# Patient Record
Sex: FEMALE | Race: WHITE | NOT HISPANIC OR LATINO | Employment: FULL TIME | URBAN - METROPOLITAN AREA
[De-identification: names, ages, dates, MRNs, and addresses within clinical notes are randomized per-mention and may not be internally consistent; named-entity substitution may affect disease eponyms.]

---

## 2021-07-16 ENCOUNTER — RECORDS - HEALTHEAST (OUTPATIENT)
Dept: ADMINISTRATIVE | Facility: CLINIC | Age: 26
End: 2021-07-16

## 2021-10-11 ENCOUNTER — APPOINTMENT (OUTPATIENT)
Dept: ULTRASOUND IMAGING | Facility: CLINIC | Age: 26
End: 2021-10-11
Attending: EMERGENCY MEDICINE
Payer: COMMERCIAL

## 2021-10-11 ENCOUNTER — HOSPITAL ENCOUNTER (EMERGENCY)
Facility: CLINIC | Age: 26
Discharge: HOME | End: 2021-10-11
Attending: EMERGENCY MEDICINE | Admitting: EMERGENCY MEDICINE
Payer: COMMERCIAL

## 2021-10-11 VITALS
WEIGHT: 290 LBS | HEIGHT: 67 IN | OXYGEN SATURATION: 99 % | HEART RATE: 72 BPM | RESPIRATION RATE: 23 BRPM | DIASTOLIC BLOOD PRESSURE: 67 MMHG | SYSTOLIC BLOOD PRESSURE: 126 MMHG | BODY MASS INDEX: 45.52 KG/M2 | TEMPERATURE: 97.6 F

## 2021-10-11 DIAGNOSIS — K80.50 BILIARY COLIC: ICD-10-CM

## 2021-10-11 LAB
ALBUMIN SERPL-MCNC: 3.9 G/DL (ref 3.5–5)
ALP SERPL-CCNC: 46 U/L (ref 45–120)
ALT SERPL W P-5'-P-CCNC: 24 U/L (ref 0–45)
ANION GAP SERPL CALCULATED.3IONS-SCNC: 14 MMOL/L (ref 5–18)
AST SERPL W P-5'-P-CCNC: 26 U/L (ref 0–40)
ATRIAL RATE - MUSE: 76 BPM
BASOPHILS # BLD AUTO: 0.1 10E3/UL (ref 0–0.2)
BASOPHILS NFR BLD AUTO: 1 %
BILIRUB SERPL-MCNC: 0.7 MG/DL (ref 0–1)
BUN SERPL-MCNC: 10 MG/DL (ref 8–22)
CALCIUM SERPL-MCNC: 9.4 MG/DL (ref 8.5–10.5)
CHLORIDE BLD-SCNC: 107 MMOL/L (ref 98–107)
CO2 SERPL-SCNC: 21 MMOL/L (ref 22–31)
CREAT SERPL-MCNC: 0.79 MG/DL (ref 0.6–1.1)
DIASTOLIC BLOOD PRESSURE - MUSE: 85 MMHG
EOSINOPHIL # BLD AUTO: 0.2 10E3/UL (ref 0–0.7)
EOSINOPHIL NFR BLD AUTO: 2 %
ERYTHROCYTE [DISTWIDTH] IN BLOOD BY AUTOMATED COUNT: 12.7 % (ref 10–15)
GFR SERPL CREATININE-BSD FRML MDRD: >90 ML/MIN/1.73M2
GLUCOSE BLD-MCNC: 106 MG/DL (ref 70–125)
HCG SERPL QL: NEGATIVE
HCT VFR BLD AUTO: 40.1 % (ref 35–47)
HGB BLD-MCNC: 12.6 G/DL (ref 11.7–15.7)
HOLD SPECIMEN: NORMAL
IMM GRANULOCYTES # BLD: 0 10E3/UL
IMM GRANULOCYTES NFR BLD: 0 %
INTERPRETATION ECG - MUSE: NORMAL
LIPASE SERPL-CCNC: 16 U/L (ref 0–52)
LYMPHOCYTES # BLD AUTO: 2.1 10E3/UL (ref 0.8–5.3)
LYMPHOCYTES NFR BLD AUTO: 31 %
MCH RBC QN AUTO: 26.8 PG (ref 26.5–33)
MCHC RBC AUTO-ENTMCNC: 31.4 G/DL (ref 31.5–36.5)
MCV RBC AUTO: 85 FL (ref 78–100)
MONOCYTES # BLD AUTO: 0.6 10E3/UL (ref 0–1.3)
MONOCYTES NFR BLD AUTO: 8 %
NEUTROPHILS # BLD AUTO: 3.8 10E3/UL (ref 1.6–8.3)
NEUTROPHILS NFR BLD AUTO: 58 %
NRBC # BLD AUTO: 0 10E3/UL
NRBC BLD AUTO-RTO: 0 /100
P AXIS - MUSE: 34 DEGREES
PLATELET # BLD AUTO: 271 10E3/UL (ref 150–450)
POTASSIUM BLD-SCNC: 4 MMOL/L (ref 3.5–5)
PR INTERVAL - MUSE: 142 MS
PROT SERPL-MCNC: 7.2 G/DL (ref 6–8)
QRS DURATION - MUSE: 88 MS
QT - MUSE: 382 MS
QTC - MUSE: 429 MS
R AXIS - MUSE: 13 DEGREES
RBC # BLD AUTO: 4.71 10E6/UL (ref 3.8–5.2)
SODIUM SERPL-SCNC: 142 MMOL/L (ref 136–145)
SYSTOLIC BLOOD PRESSURE - MUSE: 146 MMHG
T AXIS - MUSE: 24 DEGREES
VENTRICULAR RATE- MUSE: 76 BPM
WBC # BLD AUTO: 6.6 10E3/UL (ref 4–11)

## 2021-10-11 PROCEDURE — 250N000013 HC RX MED GY IP 250 OP 250 PS 637: Performed by: EMERGENCY MEDICINE

## 2021-10-11 PROCEDURE — 83690 ASSAY OF LIPASE: CPT | Performed by: EMERGENCY MEDICINE

## 2021-10-11 PROCEDURE — C9113 INJ PANTOPRAZOLE SODIUM, VIA: HCPCS | Performed by: EMERGENCY MEDICINE

## 2021-10-11 PROCEDURE — 250N000011 HC RX IP 250 OP 636: Performed by: EMERGENCY MEDICINE

## 2021-10-11 PROCEDURE — 96375 TX/PRO/DX INJ NEW DRUG ADDON: CPT

## 2021-10-11 PROCEDURE — 99285 EMERGENCY DEPT VISIT HI MDM: CPT | Mod: 25

## 2021-10-11 PROCEDURE — 96374 THER/PROPH/DIAG INJ IV PUSH: CPT

## 2021-10-11 PROCEDURE — 93005 ELECTROCARDIOGRAM TRACING: CPT | Performed by: EMERGENCY MEDICINE

## 2021-10-11 PROCEDURE — 85025 COMPLETE CBC W/AUTO DIFF WBC: CPT | Performed by: EMERGENCY MEDICINE

## 2021-10-11 PROCEDURE — 36415 COLL VENOUS BLD VENIPUNCTURE: CPT | Performed by: EMERGENCY MEDICINE

## 2021-10-11 PROCEDURE — 76705 ECHO EXAM OF ABDOMEN: CPT

## 2021-10-11 PROCEDURE — 80053 COMPREHEN METABOLIC PANEL: CPT | Performed by: EMERGENCY MEDICINE

## 2021-10-11 PROCEDURE — 250N000009 HC RX 250: Performed by: EMERGENCY MEDICINE

## 2021-10-11 PROCEDURE — 84703 CHORIONIC GONADOTROPIN ASSAY: CPT | Performed by: EMERGENCY MEDICINE

## 2021-10-11 RX ORDER — FAMOTIDINE 20 MG/1
20 TABLET, FILM COATED ORAL 2 TIMES DAILY
Qty: 30 TABLET | Refills: 0 | Status: SHIPPED | OUTPATIENT
Start: 2021-10-11 | End: 2021-10-11

## 2021-10-11 RX ADMIN — LIDOCAINE HYDROCHLORIDE 30 ML: 20 SOLUTION ORAL; TOPICAL at 04:49

## 2021-10-11 RX ADMIN — FAMOTIDINE 20 MG: 10 INJECTION, SOLUTION INTRAVENOUS at 04:52

## 2021-10-11 RX ADMIN — PANTOPRAZOLE SODIUM 40 MG: 40 INJECTION, POWDER, FOR SOLUTION INTRAVENOUS at 04:50

## 2021-10-11 ASSESSMENT — ENCOUNTER SYMPTOMS
VOMITING: 0
CHEST TIGHTNESS: 1
NAUSEA: 0
DIAPHORESIS: 0
SHORTNESS OF BREATH: 1

## 2021-10-11 ASSESSMENT — MIFFLIN-ST. JEOR: SCORE: 2078.06

## 2021-10-11 NOTE — ED PROVIDER NOTES
NAME: Elizabeth Cortez  AGE: 26 year old female  YOB: 1995  MRN: [id number]  EVALUATION DATE & TIME: 10/11/2021  3:28 AM    PCP: Sherrie Meza    ED PROVIDER: Hank Villavicencio M.D.      Chief Complaint   Patient presents with     Chest Pain     Shortness of Breath     FINAL IMPRESSION:  1. Biliary colic      MEDICAL DECISION MAKIN:08 AM I met with the patient, obtained history, performed an initial exam, and discussed options and plan for diagnostics and treatment here in the ED. PPE: Provider wore gloves and surgical mask.     Patient was clinically assessed and consented to treatment. After assessment, medical decision making and workup were discussed with the patient. The patient was agreeable to plan for testing, workup, and treatment.  Pertinent Labs & Imaging studies reviewed. (See chart for details)         Elizabeth Cortez is a 26 year oldfemale who presents with chest pain shortness of breath.   Differential diagnosis includes but not limited to GERD with esophagitis, esophageal spasm, acute coronary syndrome, pulmonary embolism, biliary colic, cholecystitis, choledocholithiasis.  Patient with sudden onset burning chest pain and spasms along with feeling something caught her breath.  She reports that it comes intermittently and she has had this in the past.  Patient has been seen previously and given GI cocktail for GERD with esophagitis she reports.  She does not appear in acute distress and no leg swelling or tenderness I do not suspect acute coronary syndrome or pulmonary embolism.  Patient is more tender in the epigastrium just under the xiphoid.  More concerning would be esophageal spasm or possibly gallbladder such as biliary colic.  Patient was labs ordered but also given GI cocktail, Pepcid, and Protonix for GERD treatment.  EKG was unremarkable for any acute ischemia and given patient's age I do not feel cardiac etiology is likely.  Pain is also not likely pleuritic  and patient is not short of breath nor tachycardic or hypoxic and unlikely pulmonary embolism.  Labs so far unremarkable with normal CBC, negative lipase and normal metabolic panel.  Patient was also not pregnant and will be sent for ultrasound to evaluate gallbladder.  Patient symptoms better with a GI cocktail, Pepcid and Protonix and likely GERD related however will await ultrasound results.  Ultrasound showing cholelithiasis however no signs of cholecystitis.  Patient's pain completely gone and feeling better and I feel this is likely biliary colic and not GERD with esophagitis.  Patient symptoms would fit with biliary colic as she has the spasms specially after heavy or greasy meals such as pizza she ate last night.  Pain is controlled and labs otherwise were unremarkable patient will be amenable to outpatient elective cholecystectomy.  Given instructions regarding the biliary colic and discussed the ultrasound.  Patient will be recommended for outpatient follow-up and will contact her primary doctor for surgical referral as well I gave her our general surgery clinic follow-up if she wishes to see our general surgery team for elective cholecystectomy.    The importance of close follow up was discussed. We reviewed warning signs and symptoms, and I instructed Ms. Cortez to return to the emergency department immediately if she develops any new or worsening symptoms. I provided additional verbal discharge instructions. Ms. Cortez expressed understanding and agreement with this plan of care, her questions were answered, and she was discharged in stable condition.       0 minutes of critical care time    MEDICATIONS GIVEN IN THE EMERGENCY:  Medications   lidocaine (XYLOCAINE) 2 % 15 mL, alum & mag hydroxide-simethicone (MAALOX) 15 mL GI Cocktail (30 mLs Oral Given 10/11/21 7928)   famotidine (PEPCID) injection 20 mg (20 mg Intravenous Given 10/11/21 0844)   pantoprazole (PROTONIX) IV push injection 40 mg (40 mg  "Intravenous Given 10/11/21 0450)       NEW PRESCRIPTIONS STARTED AT TODAY'S ER VISIT:  There are no discharge medications for this patient.         =================================================================    HPI    Patient information was obtained from: patient    Use of : N/A         Elizabeth Cortez is a 26 year old female with a reported history of GERD, who presents to this ED for evaluation of chest pain and shortness of breath.     Patient reports chest tightness/burning and shortness of breath onset at 0230 this morning, which woke her from sleep. States that it is one of her \"GERD attacks,\" as she has had several bad episodes this year and symptoms are similar. Took 20mg Famotidine and Tums prior to arrival. Notes that she had pizza last night for dinner. Otherwise denies nausea, vomiting, diaphoresis, or any other complaints at this time.      REVIEW OF SYSTEMS   Review of Systems   Constitutional: Negative for diaphoresis.   Respiratory: Positive for chest tightness (and burning) and shortness of breath (secondary to chest burning).    Gastrointestinal: Negative for nausea and vomiting.   All other systems reviewed and are negative.       PAST MEDICAL HISTORY:  History reviewed. No pertinent past medical history.    PAST SURGICAL HISTORY:  History reviewed. No pertinent surgical history.    CURRENT MEDICATIONS:    No current facility-administered medications for this encounter.  No current outpatient medications on file.    ALLERGIES:  Allergies   Allergen Reactions     Vancomycin Anaphylaxis       FAMILY HISTORY:  History reviewed. No pertinent family history.    SOCIAL HISTORY:   Social History     Socioeconomic History     Marital status: Single     Spouse name: Not on file     Number of children: Not on file     Years of education: Not on file     Highest education level: Not on file   Occupational History     Not on file   Tobacco Use     Smoking status: Never Smoker   Substance " "and Sexual Activity     Alcohol use: Not on file     Drug use: Not on file     Sexual activity: Not on file   Other Topics Concern     Not on file   Social History Narrative     Not on file     Social Determinants of Health     Financial Resource Strain:      Difficulty of Paying Living Expenses:    Food Insecurity:      Worried About Running Out of Food in the Last Year:      Ran Out of Food in the Last Year:    Transportation Needs:      Lack of Transportation (Medical):      Lack of Transportation (Non-Medical):    Physical Activity:      Days of Exercise per Week:      Minutes of Exercise per Session:    Stress:      Feeling of Stress :    Social Connections:      Frequency of Communication with Friends and Family:      Frequency of Social Gatherings with Friends and Family:      Attends Taoist Services:      Active Member of Clubs or Organizations:      Attends Club or Organization Meetings:      Marital Status:    Intimate Partner Violence:      Fear of Current or Ex-Partner:      Emotionally Abused:      Physically Abused:      Sexually Abused:        PHYSICAL EXAM:    Vitals: BP (!) 146/85   Pulse 83   Temp 97.6  F (36.4  C) (Oral)   Resp 23   Ht 1.702 m (5' 7\")   Wt 131.5 kg (290 lb)   LMP 09/27/2021 (Approximate)   SpO2 99%   Breastfeeding No   BMI 45.42 kg/m     Physical Exam  Vitals and nursing note reviewed.   Constitutional:       General: She is not in acute distress.     Appearance: She is well-developed and normal weight. She is not ill-appearing or toxic-appearing.   HENT:      Head: Normocephalic.   Cardiovascular:      Rate and Rhythm: Normal rate and regular rhythm.      Heart sounds: Normal heart sounds.   Pulmonary:      Effort: Pulmonary effort is normal. No tachypnea, accessory muscle usage or respiratory distress.      Breath sounds: Normal breath sounds. No stridor.   Chest:      Chest wall: No tenderness.   Abdominal:      Palpations: Abdomen is soft.      Tenderness: There is " abdominal tenderness in the epigastric area. There is no right CVA tenderness, left CVA tenderness, guarding or rebound.      Hernia: No hernia is present.   Musculoskeletal:      Right lower leg: No tenderness. No edema.      Left lower leg: No tenderness. No edema.   Skin:     General: Skin is warm and dry.      Coloration: Skin is not pale.   Neurological:      General: No focal deficit present.      Mental Status: She is alert.   Psychiatric:         Mood and Affect: Mood normal.           LAB:  All pertinent labs reviewed and interpreted.  Labs Ordered and Resulted from Time of ED Arrival Up to the Time of Departure from the ED   COMPREHENSIVE METABOLIC PANEL - Abnormal; Notable for the following components:       Result Value    Carbon Dioxide (CO2) 21 (*)     All other components within normal limits   CBC WITH PLATELETS AND DIFFERENTIAL - Abnormal; Notable for the following components:    MCHC 31.4 (*)     All other components within normal limits   LIPASE - Normal   HCG QUALITATIVE PREGNANCY - Normal   EXTRA BLUE TOP TUBE   EXTRA RED TOP TUBE   EXTRA GREEN TOP (LITHIUM HEPARIN) TUBE   EXTRA PURPLE TOP TUBE   PERIPHERAL IV CATHETER   EXTRA TUBE    Narrative:     The following orders were created for panel order [place] Draw.  Procedure                               Abnormality         Status                     ---------                               -----------         ------                     Extra Blue Top Tube[[id number]]                              Final result               Extra Red Top Tube[[id number]]                               Final result               Extra Green Top (Lithium...[[id number]]                      Final result               Extra Purple Top Tube[[id number]]                            Final result                 Please view results for these tests on the individual orders.   CBC WITH PLATELETS & DIFFERENTIAL    Narrative:     The following orders were created for panel order CBC  with platelets differential.  Procedure                               Abnormality         Status                     ---------                               -----------         ------                     CBC with platelets and d...[[id number]]  Abnormal            Final result                 Please view results for these tests on the individual orders.     RADIOLOGY:  US Abdomen Limited (RUQ)   Final Result   IMPRESSION:   1.  Cholelithiasis without evidence for cholecystitis.      2.  No biliary dilatation.      3.  Diffuse fatty infiltration of the liver.                       EKG:   Performed at: 03:39  Impression: sinus rhythm with sinus arrhythmia. No change on comparison.   Rate: 76  Rhythm: sinus rhythm with sinus arrhythmia   QRS Interval: 88  QTc Interval: 429  Comparison: 22-Mar-2021  I have independently reviewed and interpreted the EKG(s) documented above.     PROCEDURES:   Procedures     I, Latricia Ross, am serving as a scribe to document services personally performed by Dr. Hank Villavicencio  based on my observation and the provider's statements to me. I, Hank Villavicencio MD attest that Latricia Ross is acting in a scribe capacity, has observed my performance of the services and has documented them in accordance with my direction.      Hank Villavicencio M.D.  Emergency Medicine  [facility] EMERGENCY ROOM  [facility] [facility] [address] [phone]  Dept: [phone]     Hank Villavicencio MD  10/11/21 0548       Hank Villavicencio MD  10/11/21 0626       Hank Villavicencio MD  10/11/21 0637

## 2021-10-11 NOTE — ED NOTES
Pt concurs with triage note - she adds she has been in the ED 3 other times for GERD. She does take a medication daily. Pt is alert, orient and appropriate. Her skin is warm and dry. She has no increased distress with ambulation.

## 2021-10-11 NOTE — ED TRIAGE NOTES
"Pt arrives for complaint of chest pain and shortness of breath.pt states [facility] the end of [facility]. States \"gerd attack\".   "

## 2021-10-18 ENCOUNTER — APPOINTMENT (OUTPATIENT)
Dept: ULTRASOUND IMAGING | Facility: CLINIC | Age: 26
End: 2021-10-18
Attending: EMERGENCY MEDICINE
Payer: COMMERCIAL

## 2021-10-18 ENCOUNTER — HOSPITAL ENCOUNTER (EMERGENCY)
Facility: CLINIC | Age: 26
Discharge: HOME | End: 2021-10-18
Attending: EMERGENCY MEDICINE | Admitting: EMERGENCY MEDICINE
Payer: COMMERCIAL

## 2021-10-18 VITALS
SYSTOLIC BLOOD PRESSURE: 133 MMHG | DIASTOLIC BLOOD PRESSURE: 61 MMHG | HEIGHT: 67 IN | BODY MASS INDEX: 45.52 KG/M2 | TEMPERATURE: 98.3 F | RESPIRATION RATE: 18 BRPM | OXYGEN SATURATION: 99 % | HEART RATE: 69 BPM | WEIGHT: 290 LBS

## 2021-10-18 DIAGNOSIS — R10.11 RIGHT UPPER QUADRANT PAIN: ICD-10-CM

## 2021-10-18 DIAGNOSIS — K80.50 BILIARY COLIC SYMPTOM: ICD-10-CM

## 2021-10-18 LAB
ALBUMIN SERPL-MCNC: 3.9 G/DL (ref 3.5–5)
ALP SERPL-CCNC: 52 U/L (ref 45–120)
ALT SERPL W P-5'-P-CCNC: 20 U/L (ref 0–45)
ANION GAP SERPL CALCULATED.3IONS-SCNC: 13 MMOL/L (ref 5–18)
AST SERPL W P-5'-P-CCNC: 19 U/L (ref 0–40)
BASOPHILS # BLD AUTO: 0 10E3/UL (ref 0–0.2)
BASOPHILS NFR BLD AUTO: 1 %
BILIRUB SERPL-MCNC: 0.7 MG/DL (ref 0–1)
BUN SERPL-MCNC: 8 MG/DL (ref 8–22)
CALCIUM SERPL-MCNC: 9.4 MG/DL (ref 8.5–10.5)
CHLORIDE BLD-SCNC: 109 MMOL/L (ref 98–107)
CO2 SERPL-SCNC: 20 MMOL/L (ref 22–31)
CREAT SERPL-MCNC: 0.78 MG/DL (ref 0.6–1.1)
EOSINOPHIL # BLD AUTO: 0.2 10E3/UL (ref 0–0.7)
EOSINOPHIL NFR BLD AUTO: 3 %
ERYTHROCYTE [DISTWIDTH] IN BLOOD BY AUTOMATED COUNT: 12.9 % (ref 10–15)
GFR SERPL CREATININE-BSD FRML MDRD: >90 ML/MIN/1.73M2
GLUCOSE BLD-MCNC: 99 MG/DL (ref 70–125)
HCT VFR BLD AUTO: 39.6 % (ref 35–47)
HGB BLD-MCNC: 12.9 G/DL (ref 11.7–15.7)
IMM GRANULOCYTES # BLD: 0 10E3/UL
IMM GRANULOCYTES NFR BLD: 0 %
LIPASE SERPL-CCNC: 19 U/L (ref 0–52)
LYMPHOCYTES # BLD AUTO: 2.4 10E3/UL (ref 0.8–5.3)
LYMPHOCYTES NFR BLD AUTO: 35 %
MCH RBC QN AUTO: 27.3 PG (ref 26.5–33)
MCHC RBC AUTO-ENTMCNC: 32.6 G/DL (ref 31.5–36.5)
MCV RBC AUTO: 84 FL (ref 78–100)
MONOCYTES # BLD AUTO: 0.7 10E3/UL (ref 0–1.3)
MONOCYTES NFR BLD AUTO: 10 %
NEUTROPHILS # BLD AUTO: 3.5 10E3/UL (ref 1.6–8.3)
NEUTROPHILS NFR BLD AUTO: 51 %
NRBC # BLD AUTO: 0 10E3/UL
NRBC BLD AUTO-RTO: 0 /100
PLATELET # BLD AUTO: 285 10E3/UL (ref 150–450)
POTASSIUM BLD-SCNC: 3.7 MMOL/L (ref 3.5–5)
PROT SERPL-MCNC: 7.1 G/DL (ref 6–8)
RBC # BLD AUTO: 4.73 10E6/UL (ref 3.8–5.2)
SODIUM SERPL-SCNC: 142 MMOL/L (ref 136–145)
WBC # BLD AUTO: 6.8 10E3/UL (ref 4–11)

## 2021-10-18 PROCEDURE — 76705 ECHO EXAM OF ABDOMEN: CPT

## 2021-10-18 PROCEDURE — 85004 AUTOMATED DIFF WBC COUNT: CPT | Performed by: EMERGENCY MEDICINE

## 2021-10-18 PROCEDURE — 250N000011 HC RX IP 250 OP 636: Performed by: EMERGENCY MEDICINE

## 2021-10-18 PROCEDURE — 36415 COLL VENOUS BLD VENIPUNCTURE: CPT | Performed by: EMERGENCY MEDICINE

## 2021-10-18 PROCEDURE — 96374 THER/PROPH/DIAG INJ IV PUSH: CPT

## 2021-10-18 PROCEDURE — 99285 EMERGENCY DEPT VISIT HI MDM: CPT | Mod: 25

## 2021-10-18 PROCEDURE — 80053 COMPREHEN METABOLIC PANEL: CPT | Performed by: EMERGENCY MEDICINE

## 2021-10-18 PROCEDURE — 83690 ASSAY OF LIPASE: CPT | Performed by: EMERGENCY MEDICINE

## 2021-10-18 PROCEDURE — 96375 TX/PRO/DX INJ NEW DRUG ADDON: CPT

## 2021-10-18 RX ORDER — ONDANSETRON 2 MG/ML
4 INJECTION INTRAMUSCULAR; INTRAVENOUS ONCE
Status: COMPLETED | OUTPATIENT
Start: 2021-10-18 | End: 2021-10-18

## 2021-10-18 RX ORDER — MORPHINE SULFATE 4 MG/ML
4 INJECTION, SOLUTION INTRAMUSCULAR; INTRAVENOUS ONCE
Status: COMPLETED | OUTPATIENT
Start: 2021-10-18 | End: 2021-10-18

## 2021-10-18 RX ORDER — KETOROLAC TROMETHAMINE 30 MG/ML
30 INJECTION, SOLUTION INTRAMUSCULAR; INTRAVENOUS ONCE
Status: COMPLETED | OUTPATIENT
Start: 2021-10-18 | End: 2021-10-18

## 2021-10-18 RX ORDER — FAMOTIDINE 10 MG
10 TABLET ORAL DAILY
Qty: 14 TABLET | Refills: 0 | Status: SHIPPED | OUTPATIENT
Start: 2021-10-18 | End: 2021-11-01

## 2021-10-18 RX ORDER — DIPHENHYDRAMINE HYDROCHLORIDE 50 MG/ML
25 INJECTION INTRAMUSCULAR; INTRAVENOUS ONCE
Status: DISCONTINUED | OUTPATIENT
Start: 2021-10-18 | End: 2021-10-18 | Stop reason: HOSPADM

## 2021-10-18 RX ORDER — SUCRALFATE 1 G/1
1 TABLET ORAL
Qty: 90 TABLET | Refills: 0 | Status: SHIPPED | OUTPATIENT
Start: 2021-10-18 | End: 2021-11-17

## 2021-10-18 RX ORDER — FAMOTIDINE 20 MG/1
20 TABLET, FILM COATED ORAL
COMMUNITY
Start: 2021-05-07

## 2021-10-18 RX ORDER — ONDANSETRON 8 MG/1
8 TABLET, ORALLY DISINTEGRATING ORAL EVERY 8 HOURS PRN
Qty: 12 TABLET | Refills: 0 | Status: SHIPPED | OUTPATIENT
Start: 2021-10-18

## 2021-10-18 RX ADMIN — ONDANSETRON 4 MG: 2 INJECTION INTRAMUSCULAR; INTRAVENOUS at 03:58

## 2021-10-18 RX ADMIN — ONDANSETRON 4 MG: 2 INJECTION INTRAMUSCULAR; INTRAVENOUS at 03:34

## 2021-10-18 RX ADMIN — KETOROLAC TROMETHAMINE 30 MG: 30 INJECTION, SOLUTION INTRAMUSCULAR at 03:34

## 2021-10-18 RX ADMIN — MORPHINE SULFATE 4 MG: 4 INJECTION INTRAVENOUS at 03:34

## 2021-10-18 ASSESSMENT — MIFFLIN-ST. JEOR: SCORE: 2078.06

## 2021-10-18 ASSESSMENT — ENCOUNTER SYMPTOMS
VOMITING: 0
CHILLS: 1
NAUSEA: 1
FEVER: 0
ABDOMINAL PAIN: 1

## 2021-10-18 NOTE — ED TRIAGE NOTES
Pt presents to ED with c/o upper abdominal pain that started around 0230.  Pt states she was in the ER about 1 week ago and diagnosed with gallbladder issue.  Has surgery consult scheduled for 10/28.  No medications taken PTA.  +nausea.

## 2021-10-18 NOTE — ED PROVIDER NOTES
EMERGENCY DEPARTMENT ENCOUNTER      NAME: Elizabeth Pope  AGE: 26 year old female  YOB: 1995  MRN: [id number]  EVALUATION DATE & TIME: No admission date for patient encounter.    PCP: Sherrie Meza    ED PROVIDER: Scarlet Kimball M.D.      Chief Complaint   Patient presents with     Abdominal Pain         FINAL IMPRESSION:  No diagnosis found.    MEDICAL DECISION MAKING:    Pertinent Labs & Imaging studies reviewed. (See chart for details)  ED Course as of Oct 18 0407   Mon Oct 18, 2021   0320 Afebrile.  Vital signs here unremarkable.  Patient is coming in with right upper quadrant pain.  Woke patient from sleep.'s been having issues with this ongoing for the last several weeks.  Was seen in the emergency department approximately 1 week ago for similar symptoms, likely symptomatic cholelithiasis.  Had work-up done there that just showed fatty liver, cholelithiasis without evidence of cholecystitis.  Similar episode today with after eating high-fat meal she gets pain that wakes her up from sleep later in the evening.  Nauseous with this.  No vomiting.  No fevers or chills.  No medications taken prior to arrival.Physical exam for patient here with epigastric right upper quadrant pain positive Santana sign.  Otherwise unremarkable.Will repeat labs, check LFTs, lipase and ultrasound.  Will give Toradol, morphine and Zofran for pain and nausea control.      0355 After morphine patient states her pain improved for about 5 minutes then she became more nauseated and felt slightly itchy.  She does not have any hives.  Was evaluated, primary complaint is to still accept the epigastric abdominal pain and nausea.  Will give an additional Zofran, small amount of Benadryl and then will give GI cocktail as she states this is helped her in the past.  Concerned this may be representative of possible PUD versus GERD with esophagitis as well.        Labs unremarkable.    Ultrasound here today shows distended  gallbladder.  Patient's pain is better after Zofran and GI cocktail.  May be secondary to possible gastritis versus PUD in addition.  However given the fact he does get these paroxysms of pain after eating, she does have a distended gallbladder I feel that she should get this further evaluated.  She has already contacted a surgeon and has a first appointment scheduled on 28th, discussed with her she should call them back today and see if there is any way she can get in earlier.  Otherwise prescribed medications for possible gastritis, GERD in the emergency department as well as nausea as these medications have helped her and told to take them daily.  Follow-up with primary care and surgery.  Return precautions discussed.      Critical care: 0 minutes excluding separately billable procedures.  Includes bedside management, time reviewing test results, review of records, discussing the case with staff, documenting the medical record and time spent with family members (or surrogate decision makers) discussing specific treatment issues.          ED COURSE:  3:16 AM   I evaluated the patient to gather history and perform initial exam. ED course and treatment plan was discussed. PPE worn: surgical mask, hair cap, and gloves.      3:53 AM   RN reports that the Morphine did not relieve patient's symptoms. She is feeling more pain and nauseous.     3:54 AM   I reassessed the patient.     4:07 AM   I reassessed the patient. She reports that her pain is slightly improving.     4:39 AM   I updated the patient with her results. She reports feeling improvement with symptoms.  I discussed plans for discharge with extensive anticipatory guidance and given return precautions. Patient was agreeable with the plan.      The importance of close follow up was discussed. We reviewed warning signs and symptoms, and I instructed Ms. Pope to return to the emergency department immediately if she develops any new or worsening symptoms. I  provided additional verbal discharge instructions. Ms. Pope expressed understanding and agreement with this plan of care, her questions were answered, and she was discharged in stable condition.     MEDICATIONS GIVEN IN THE EMERGENCY:  Medications   diphenhydrAMINE (BENADRYL) injection 25 mg (has no administration in time range)   lidocaine (XYLOCAINE) 2 % 15 mL, alum & mag hydroxide-simethicone (MAALOX) 15 mL GI Cocktail (has no administration in time range)   ondansetron (ZOFRAN) injection 4 mg (4 mg Intravenous Given 10/18/21 0334)   morphine (PF) injection 4 mg (4 mg Intravenous Given 10/18/21 0334)   ketorolac (TORADOL) injection 30 mg (30 mg Intravenous Given 10/18/21 0334)   ondansetron (ZOFRAN) injection 4 mg (4 mg Intravenous Given 10/18/21 0358)       NEW PRESCRIPTIONS STARTED AT TODAY'S ER VISIT:  New Prescriptions    No medications on file          =================================================================    HPI    Patient information was obtained from: patient     Use of : N/A         Elizabeth Pope is a 26 year old female with a pertinent history of GERD, MRSA, obesity who presents with epigastric and right upper quadrant pain.    Per chart review, patient was seen on 10/11/21 for chest pain and shortness of breath. EKG was unremarkable. Labs were also unremarkable with normal CBC, negative lipase and normal BMP. Ultrasound showed cholelithiasis however there were no signs of cholecystitis. She was given GI cocktail, Pepcid and Protonix which did improve her symptoms. She was diagnosed with bilary colic. Pain was controlled and she was recommended for outpatient elective cholecystectomy and follow up with primary care.      Patient reports she was woken up with sudden onset of right upper quadrant abdominal pain that had worsened which prompt her to come to the ED this morning. She has been having these episodes ongoing for a couple of weeks now.     She also endorses associated  symptoms with nausea but has not yet vomited along with chills. She does have kidney stones in her gallbladder and feels similar to them except today is more intense in severity of pain. She does have an appointment on 10/28 for her cholecystectomy. Her last menses was 3-4 weeks ago.     She denies fever or any other associated symptoms at this time.     REVIEW OF SYSTEMS   Review of Systems   Constitutional: Positive for chills. Negative for fever.   Gastrointestinal: Positive for abdominal pain (RUQ) and nausea. Negative for vomiting.   All other systems reviewed and are negative.        PAST MEDICAL HISTORY:  No past medical history on file.    PAST SURGICAL HISTORY:  No past surgical history on file.    CURRENT MEDICATIONS:      Current Facility-Administered Medications:      diphenhydrAMINE (BENADRYL) injection 25 mg, 25 mg, Intravenous, Once, Cora Kimball MD     lidocaine (XYLOCAINE) 2 % 15 mL, alum & mag hydroxide-simethicone (MAALOX) 15 mL GI Cocktail, 30 mL, Oral, Once, Cora Kimball MD    Current Outpatient Medications:      Ascorbic Acid (VITAMIN C PO), , Disp: , Rfl:      famotidine (PEPCID) 20 MG tablet, Take 20 mg by mouth, Disp: , Rfl:      ZINC ASPARTATE PO, , Disp: , Rfl:     ALLERGIES:  Allergies   Allergen Reactions     Vancomycin Anaphylaxis       FAMILY HISTORY:  No family history on file.    SOCIAL HISTORY:   Social History     Socioeconomic History     Marital status: Single     Spouse name: Not on file     Number of children: Not on file     Years of education: Not on file     Highest education level: Not on file   Occupational History     Not on file   Tobacco Use     Smoking status: Never Smoker   Substance and Sexual Activity     Alcohol use: Not on file     Drug use: Not on file     Sexual activity: Not on file   Other Topics Concern     Not on file   Social History Narrative     Not on file     Social Determinants of Health     Financial Resource Strain:      Difficulty of Paying Living  "Expenses:    Food Insecurity:      Worried About Running Out of Food in the Last Year:      Ran Out of Food in the Last Year:    Transportation Needs:      Lack of Transportation (Medical):      Lack of Transportation (Non-Medical):    Physical Activity:      Days of Exercise per Week:      Minutes of Exercise per Session:    Stress:      Feeling of Stress :    Social Connections:      Frequency of Communication with Friends and Family:      Frequency of Social Gatherings with Friends and Family:      Attends Muslim Services:      Active Member of Clubs or Organizations:      Attends Club or Organization Meetings:      Marital Status:    Intimate Partner Violence:      Fear of Current or Ex-Partner:      Emotionally Abused:      Physically Abused:      Sexually Abused:        PHYSICAL EXAM:    Vitals: /61   Pulse 69   Temp 98.3  F (36.8  C) (Oral)   Resp 18   Ht 1.702 m (5' 7\")   Wt 131.5 kg (290 lb)   LMP 09/27/2021 (Approximate)   SpO2 99%   BMI 45.42 kg/m     General:. Alert and interactive, comfortable appearing.  HENT: Oropharynx without erythema or exudates. MMM.  TMs clear bilaterally.  Eyes: Pupils mid-sized and equally reactive.   Neck: Full AROM.  No midline tenderness to palpation.  Cardiovascular: Regular rate and rhythm. Peripheral pulses 2+ bilaterally.  Chest/Pulmonary: Normal work of breathing. Lung sounds clear and equal throughout, no wheezes or crackles. No chest wall tenderness or deformities.  Abdomen: Soft, nondistended. Epigastric right upper quadrant pain without guarding or rebound.. Positive Santana sign.   Back/Spine: No CVA or midline tenderness.  Extremities: Normal ROM of all major joints. No lower extremity edema.   Skin: Warm and dry. Normal skin color.   Neuro: Speech clear. CNs grossly intact. Moves all extremities appropriately. Strength and sensation grossly intact to all extremities.   Psych: Normal affect/mood, cooperative, memory appropriate.     LAB:  All " pertinent labs reviewed and interpreted.  Labs Ordered and Resulted from Time of ED Arrival Up to the Time of Departure from the ED   CBC WITH PLATELETS AND DIFFERENTIAL   COMPREHENSIVE METABOLIC PANEL   LIPASE   CBC WITH PLATELETS & DIFFERENTIAL    Narrative:     The following orders were created for panel order CBC with platelets + differential.  Procedure                               Abnormality         Status                     ---------                               -----------         ------                     CBC with platelets and d...[[id number]]                      Final result                 Please view results for these tests on the individual orders.       RADIOLOGY:  Abdomen US, limited (RUQ only)    (Results Pending)         PROCEDURES:   None      I, Evie Sheriff, am serving as a scribe to document services personally performed by Dr. Scarlet Kimball  based on my observation and the provider's statements to me. I, Scarlet Kimball MD attest that Evie Sheriff is acting in a scribe capacity, has observed my performance of the services and has documented them in accordance with my direction.      Scarlet Kimball M.D.  Emergency Medicine  [facility] EMERGENCY ROOM  [address] [facility] [id number] [phone]  Dept: [phone]     Cora Kimball MD  10/18/21 2477

## 2021-10-18 NOTE — ED NOTES
Introduced self to patient and whiteboard updated.  Hourly rounding explained.  Call light in reach.  Plan of care and expected length of stay explained.  Will continue to monitor.

## 2021-10-18 NOTE — DISCHARGE INSTRUCTIONS
As discussed you should call your surgeon later today to discuss that you continue to have pain.  You should maintain a low-fat diet.  Take medications given in the emergency department for symptom control daily.

## 2021-10-18 NOTE — ED NOTES
Hourly rounding completed on patient.  Updated on plan of care.  Will continue to monitor.  Call light in reach.    Pt reports pain is now 2/10.  Pt states nausea improved but declines GI cocktail at this point.  Pt had slight itching with morphine that resolved within a minute so benadryl not given.

## 2024-05-02 ENCOUNTER — APPOINTMENT (OUTPATIENT)
Dept: CT IMAGING | Facility: CLINIC | Age: 29
End: 2024-05-02
Attending: EMERGENCY MEDICINE
Payer: COMMERCIAL

## 2024-05-02 ENCOUNTER — HOSPITAL ENCOUNTER (EMERGENCY)
Facility: CLINIC | Age: 29
Discharge: HOME | End: 2024-05-03
Attending: EMERGENCY MEDICINE | Admitting: EMERGENCY MEDICINE
Payer: COMMERCIAL

## 2024-05-02 ENCOUNTER — APPOINTMENT (OUTPATIENT)
Dept: ULTRASOUND IMAGING | Facility: CLINIC | Age: 29
End: 2024-05-02
Attending: EMERGENCY MEDICINE
Payer: COMMERCIAL

## 2024-05-02 DIAGNOSIS — I82.412 ACUTE DEEP VEIN THROMBOSIS (DVT) OF FEMORAL VEIN OF LEFT LOWER EXTREMITY (H): ICD-10-CM

## 2024-05-02 LAB
ANION GAP SERPL CALCULATED.3IONS-SCNC: 11 MMOL/L (ref 7–15)
APTT PPP: 35 SECONDS (ref 22–38)
BASOPHILS # BLD AUTO: 0 10E3/UL (ref 0–0.2)
BASOPHILS NFR BLD AUTO: 0 %
BUN SERPL-MCNC: 4.2 MG/DL (ref 6–20)
CALCIUM SERPL-MCNC: 9.2 MG/DL (ref 8.6–10)
CHLORIDE SERPL-SCNC: 103 MMOL/L (ref 98–107)
CREAT SERPL-MCNC: 0.63 MG/DL (ref 0.51–0.95)
DEPRECATED HCO3 PLAS-SCNC: 25 MMOL/L (ref 22–29)
EGFRCR SERPLBLD CKD-EPI 2021: >90 ML/MIN/1.73M2
EOSINOPHIL # BLD AUTO: 0 10E3/UL (ref 0–0.7)
EOSINOPHIL NFR BLD AUTO: 0 %
ERYTHROCYTE [DISTWIDTH] IN BLOOD BY AUTOMATED COUNT: 12.8 % (ref 10–15)
GLUCOSE SERPL-MCNC: 95 MG/DL (ref 70–99)
HCG SERPL QL: NEGATIVE
HCT VFR BLD AUTO: 38.1 % (ref 35–47)
HGB BLD-MCNC: 12.5 G/DL (ref 11.7–15.7)
IMM GRANULOCYTES # BLD: 0 10E3/UL
IMM GRANULOCYTES NFR BLD: 0 %
INR PPP: 0.93 (ref 0.85–1.15)
LYMPHOCYTES # BLD AUTO: 1.8 10E3/UL (ref 0.8–5.3)
LYMPHOCYTES NFR BLD AUTO: 30 %
MCH RBC QN AUTO: 25.4 PG (ref 26.5–33)
MCHC RBC AUTO-ENTMCNC: 32.8 G/DL (ref 31.5–36.5)
MCV RBC AUTO: 77 FL (ref 78–100)
MONOCYTES # BLD AUTO: 0.6 10E3/UL (ref 0–1.3)
MONOCYTES NFR BLD AUTO: 9 %
NEUTROPHILS # BLD AUTO: 3.5 10E3/UL (ref 1.6–8.3)
NEUTROPHILS NFR BLD AUTO: 61 %
NRBC # BLD AUTO: 0 10E3/UL
NRBC BLD AUTO-RTO: 0 /100
PLATELET # BLD AUTO: 281 10E3/UL (ref 150–450)
POTASSIUM SERPL-SCNC: 4.3 MMOL/L (ref 3.4–5.3)
RBC # BLD AUTO: 4.92 10E6/UL (ref 3.8–5.2)
SODIUM SERPL-SCNC: 139 MMOL/L (ref 135–145)
WBC # BLD AUTO: 5.9 10E3/UL (ref 4–11)

## 2024-05-02 PROCEDURE — 84484 ASSAY OF TROPONIN QUANT: CPT | Performed by: EMERGENCY MEDICINE

## 2024-05-02 PROCEDURE — 84703 CHORIONIC GONADOTROPIN ASSAY: CPT | Performed by: EMERGENCY MEDICINE

## 2024-05-02 PROCEDURE — 36415 COLL VENOUS BLD VENIPUNCTURE: CPT | Performed by: EMERGENCY MEDICINE

## 2024-05-02 PROCEDURE — 85730 THROMBOPLASTIN TIME PARTIAL: CPT | Performed by: EMERGENCY MEDICINE

## 2024-05-02 PROCEDURE — 85004 AUTOMATED DIFF WBC COUNT: CPT | Performed by: EMERGENCY MEDICINE

## 2024-05-02 PROCEDURE — 76882 US LMTD JT/FCL EVL NVASC XTR: CPT | Mod: LT

## 2024-05-02 PROCEDURE — 71275 CT ANGIOGRAPHY CHEST: CPT

## 2024-05-02 PROCEDURE — 93971 EXTREMITY STUDY: CPT | Mod: LT

## 2024-05-02 PROCEDURE — 85610 PROTHROMBIN TIME: CPT | Performed by: EMERGENCY MEDICINE

## 2024-05-02 PROCEDURE — 83880 ASSAY OF NATRIURETIC PEPTIDE: CPT | Performed by: EMERGENCY MEDICINE

## 2024-05-02 PROCEDURE — 93005 ELECTROCARDIOGRAM TRACING: CPT

## 2024-05-02 PROCEDURE — 80048 BASIC METABOLIC PNL TOTAL CA: CPT | Performed by: EMERGENCY MEDICINE

## 2024-05-02 PROCEDURE — 99285 EMERGENCY DEPT VISIT HI MDM: CPT | Mod: 25

## 2024-05-02 RX ORDER — IOPAMIDOL 755 MG/ML
500 INJECTION, SOLUTION INTRAVASCULAR ONCE
Status: COMPLETED | OUTPATIENT
Start: 2024-05-03 | End: 2024-05-03

## 2024-05-02 ASSESSMENT — COLUMBIA-SUICIDE SEVERITY RATING SCALE - C-SSRS
6. HAVE YOU EVER DONE ANYTHING, STARTED TO DO ANYTHING, OR PREPARED TO DO ANYTHING TO END YOUR LIFE?: NO
2. HAVE YOU ACTUALLY HAD ANY THOUGHTS OF KILLING YOURSELF IN THE PAST MONTH?: NO
1. IN THE PAST MONTH, HAVE YOU WISHED YOU WERE DEAD OR WISHED YOU COULD GO TO SLEEP AND NOT WAKE UP?: NO

## 2024-05-02 ASSESSMENT — ACTIVITIES OF DAILY LIVING (ADL)
ADLS_ACUITY_SCORE: 35

## 2024-05-02 NOTE — LETTER
May 3, 2024      To Whom It May Concern:      Elizabeth Pope was seen in our Emergency Department today, 05/03/24.  I expect her condition to improve over the next 2-3 days.  She may return to work/school when improved.    Sincerely,        Kayy Barber RN

## 2024-05-03 ENCOUNTER — TELEPHONE (OUTPATIENT)
Dept: OTHER | Facility: CLINIC | Age: 29
End: 2024-05-03
Payer: COMMERCIAL

## 2024-05-03 VITALS
WEIGHT: 293 LBS | TEMPERATURE: 98.6 F | BODY MASS INDEX: 44.41 KG/M2 | OXYGEN SATURATION: 100 % | DIASTOLIC BLOOD PRESSURE: 54 MMHG | HEART RATE: 105 BPM | HEIGHT: 68 IN | RESPIRATION RATE: 18 BRPM | SYSTOLIC BLOOD PRESSURE: 125 MMHG

## 2024-05-03 DIAGNOSIS — I82.412 ACUTE DEEP VEIN THROMBOSIS (DVT) OF FEMORAL VEIN OF LEFT LOWER EXTREMITY (H): Primary | ICD-10-CM

## 2024-05-03 LAB
ATRIAL RATE - MUSE: 107 BPM
DIASTOLIC BLOOD PRESSURE - MUSE: NORMAL MMHG
INTERPRETATION ECG - MUSE: NORMAL
NT-PROBNP SERPL-MCNC: <36 PG/ML (ref 0–450)
P AXIS - MUSE: 23 DEGREES
PR INTERVAL - MUSE: 144 MS
QRS DURATION - MUSE: 90 MS
QT - MUSE: 344 MS
QTC - MUSE: 459 MS
R AXIS - MUSE: 50 DEGREES
SYSTOLIC BLOOD PRESSURE - MUSE: NORMAL MMHG
T AXIS - MUSE: 26 DEGREES
TROPONIN T SERPL HS-MCNC: <6 NG/L
VENTRICULAR RATE- MUSE: 107 BPM

## 2024-05-03 PROCEDURE — 250N000011 HC RX IP 250 OP 636: Performed by: EMERGENCY MEDICINE

## 2024-05-03 PROCEDURE — 250N000009 HC RX 250: Performed by: EMERGENCY MEDICINE

## 2024-05-03 PROCEDURE — 250N000013 HC RX MED GY IP 250 OP 250 PS 637: Performed by: EMERGENCY MEDICINE

## 2024-05-03 RX ORDER — APIXABAN 5 MG (74)
KIT ORAL
Qty: 74 EACH | Refills: 0 | Status: SHIPPED | OUTPATIENT
Start: 2024-05-03 | End: 2024-05-30

## 2024-05-03 RX ADMIN — IOPAMIDOL 100 ML: 755 INJECTION, SOLUTION INTRAVENOUS at 00:10

## 2024-05-03 RX ADMIN — SODIUM CHLORIDE 100 ML: 9 INJECTION, SOLUTION INTRAVENOUS at 00:10

## 2024-05-03 RX ADMIN — APIXABAN 10 MG: 5 TABLET, FILM COATED ORAL at 01:19

## 2024-05-03 ASSESSMENT — ACTIVITIES OF DAILY LIVING (ADL): ADLS_ACUITY_SCORE: 35

## 2024-05-03 NOTE — DISCHARGE INSTRUCTIONS
Your Ultrasound shows Small amount of nonocclusive common femoral vein thrombus adjacent to the saphenofemoral junction.     Start the Eliquis as directed    Your CAT scan shows no evidence of any blood clots in the lungs    Follow-up with your primary care doctor vascular medicine Dr. Casey for reevaluation regarding the blood clot.    Follow-up with your primary care doctor in 1 to 2 days        Return to the ER for any worsening or concerning symptoms

## 2024-05-03 NOTE — ED TRIAGE NOTES
Pt arrives with complaint of left calf pain and swelling that began 4-5 days ago. Denies redness, endorses some warmth. No known injury. A&Ox4.

## 2024-05-03 NOTE — TELEPHONE ENCOUNTER
Referral received via [employer] on 05/03/24.    Pt referred to [facility] by Heather Alberto DO for DVT of femoral vein of LLE    LLE venous US 5/2/24  CT chest PE protocol 5/2/24       Routing to scheduling to coordinate the following:  D-dimer (non-fasting lab)  in the next few days  NEW VASCULAR PATIENT consult with Vascular Medicine at next available    Appt note:    Ref by Dr. Alberto for DVT of femoral vein of LLE.  Apixaban initiated 05/03/24; d-dimer prior to consult.

## 2024-05-03 NOTE — TELEPHONE ENCOUNTER
Called to schedule. Patient would like to have labs drawn at a [facility]. Patient will obtain fax number for preferred clinic and contact [facility] with fax number to send labs to. Provided [facility] phone number.

## 2024-05-03 NOTE — ED PROVIDER NOTES
"  Emergency Center Note      History of Present Illness     Chief Complaint  Leg Swelling    CLARITZA Pope is a 29 year old female with a past medical history significant for Grave's disease and Celiac disease, here for evaluation of leg swelling. Patient reports leg swelling and pain for the past 4-5 days.  Patient states that the pain is mostly to the calf.  Reports pain in the legs when ambulating. Denies shortness of breath, recent travel, recent surgeries and sitting for long periods of time. Not on birth control. No history of blood clots.    Independent Historian  None    Review of External Notes  Recent GI note for celiac disease from today  Past Medical History   Medical History and Problem List  Methicillin Resistant Staphylococcus Aureus Infection   Anemia   GERD  Obesity   Hyperthyroidism   Gallstones   GBS +TV culture   Macromastia   Binge eating disorder   Plantar fasciitis, left foot     Medications  Pepcid   Zofran     Surgical History   Breast reduction   Tonsil and adenoidectomy   Pinson teeth extraction   Gallbladder surgery    Physical Exam   Patient Vitals for the past 24 hrs:   BP Temp Pulse Resp SpO2 Height Weight   05/02/24 2345 125/54 -- -- -- 100 % -- --   05/02/24 2310 -- -- -- -- 91 % -- --   05/02/24 2255 126/86 -- -- -- 100 % -- --   05/02/24 2240 (!) 147/81 -- 105 -- 100 % -- --   05/02/24 2225 -- -- -- -- 100 % -- --   05/02/24 2219 134/69 -- 109 -- 100 % -- --   05/02/24 2020 (!) 194/101 98.6  F (37  C) 109 18 100 % -- --   05/02/24 2018 -- -- -- -- -- 1.727 m (5' 8\") 136.1 kg (300 lb)     Physical Exam  Vitals reviewed.   Constitutional:       General: She is not in acute distress.     Appearance: She is not ill-appearing.   HENT:      Head: Normocephalic and atraumatic.   Eyes:      Extraocular Movements: Extraocular movements intact.   Cardiovascular:      Rate and Rhythm: Regular rhythm. Tachycardia present.   Pulmonary:      Effort: Pulmonary effort is normal. No " respiratory distress.   Musculoskeletal:      Cervical back: Normal range of motion.      Comments: Left lower extremity: No bony deformity appreciated.  Bilateral lower extremities.  Equal and symmetric.  No overlying skin changes.  Calf tenderness appreciated.  Patient able to ambulate without any difficulty.   Skin:     General: Skin is warm and dry.   Neurological:      Mental Status: She is alert and oriented to person, place, and time.      GCS: GCS eye subscore is 4. GCS verbal subscore is 5. GCS motor subscore is 6.   Psychiatric:         Behavior: Behavior normal.         Diagnostics   Lab Results   Labs Ordered and Resulted from Time of ED Arrival to Time of ED Departure   BASIC METABOLIC PANEL - Abnormal       Result Value    Sodium 139      Potassium 4.3      Chloride 103      Carbon Dioxide (CO2) 25      Anion Gap 11      Urea Nitrogen 4.2 (*)     Creatinine 0.63      GFR Estimate >90      Calcium 9.2      Glucose 95     CBC WITH PLATELETS AND DIFFERENTIAL - Abnormal    WBC Count 5.9      RBC Count 4.92      Hemoglobin 12.5      Hematocrit 38.1      MCV 77 (*)     MCH 25.4 (*)     MCHC 32.8      RDW 12.8      Platelet Count 281      % Neutrophils 61      % Lymphocytes 30      % Monocytes 9      % Eosinophils 0      % Basophils 0      % Immature Granulocytes 0      NRBCs per 100 WBC 0      Absolute Neutrophils 3.5      Absolute Lymphocytes 1.8      Absolute Monocytes 0.6      Absolute Eosinophils 0.0      Absolute Basophils 0.0      Absolute Immature Granulocytes 0.0      Absolute NRBCs 0.0     INR - Normal    INR 0.93     PARTIAL THROMBOPLASTIN TIME - Normal    aPTT 35     HCG QUALITATIVE PREGNANCY - Normal    hCG Serum Qualitative Negative     TROPONIN T, HIGH SENSITIVITY - Normal    Troponin T, High Sensitivity <6     NT PROBNP INPATIENT - Normal    N terminal Pro BNP Inpatient <36         Imaging  CT Chest (PE) Abdomen Pelvis w Contrast   Final Result   IMPRESSION:   1.  No acute central or proximal  lobar pulmonary embolism, within limitations of exam as described.      2.  The pelvic veins are not well-opacified with contrast. The left common femoral vein thrombus described on ultrasound is not identified. No large occlusive venous thrombus identified in the pelvis.      3.  No acute findings in the chest, abdomen, or pelvis.      US Lower Extremity Venous Duplex Left   Final Result   IMPRESSION:   1.  Small amount of nonocclusive common femoral vein thrombus adjacent to the saphenofemoral junction.         Dr. Garza discussed these results over the phone with Dr. Alberto at 9:35 PM on 5/2/2024.          EKG   ECG taken at 2131, ECG read at 2135  Sinus tachycardia    Rate 107 bpm. MD interval 144 ms. QRS duration 90 ms. QT/QTc 344/459 ms. P-R-T axes 23 50 26.    ED Course    Medications Administered  Medications   CT scan flush (100 mLs Intravenous $Given 5/3/24 0010)   iopamidol (ISOVUE-370) solution 500 mL (100 mLs Intravenous $Given 5/3/24 0010)   apixaban ANTICOAGULANT (ELIQUIS) tablet 10 mg (10 mg Oral $Given 5/3/24 0119)       Procedures  Procedures         Social Determinants of Health adding to complexity of care  None    ED Course  ED Course as of 05/03/24 0129   u May 02, 2024   2031 I obtained history and examined the patient as noted above.    2330 Reassessed the patient.   2339 Patient continues have tachycardia and desaturations with ambulation.  Concerning for possible pulmonary embolism.   Fri May 03, 2024   0129 Patient updated on results no PE seen.  Patient given Eliquis here in starter pack for DVT treatment.  She was given referral to vascular medicine.  Discussed with her at length care instructions and return precautions.  Discussed with her care instructions regarding anticoagulants discussed tricked return precautions if she has any injuries, head injuries.  She verbalized understanding and agreement.     Medical Decision Making / Diagnosis     MIPS  MIPS: CT for PE was ordered because  DVT seen on ultrasound patient found to be tachycardic with intermittent desaturations concerning for possible pulmonary embolism.    VAL Pope is a 29 year old female presenting today with left calf pain.  She states that she has had increasing pain to the left calf.  No prior history of DVTs PEs in the past.  She is been ambulatory without any difficulty.  Reports no injury to the lower extremity.  Patient initially seen and evaluated in triage.  Discussed plan for an ultrasound.  Ultrasound showed nonocclusive thrombus to the left common femoral vein.  Patient was found to be significantly tachycardic on arrival and had intermittent O2 saturations at drops.  Discussed plan for CT scan to evaluate for pulmonary embolism.  She verbalized understanding and agreement.  I discussed with her the plan for close follow-up with PCP or vascular medicine for repeat imaging and reassessment and further management of the anticoagulation.  I discussed with her care instructions at length regarding Eliquis use.  Patient persistently tachycardic may be related to her history of Graves' disease.  I discussed with her the need for follow-up regarding the tachycardia.  Patient otherwise hemodynamically stable for discharge.  Return precautions including any worsening pain, chest pain, shortness of breath or any concerning symptoms.  She verbalized understanding and agreement.    Disposition  The patient was discharged.     ICD-10 Codes:    ICD-10-CM    1. Acute deep vein thrombosis (DVT) of femoral vein of left lower extremity (H)  I82.412 Vascular Medicine Referral           Discharge Medications  Discharge Medication List as of 5/3/2024  1:20 AM        START taking these medications    Details   Apixaban Starter Pack (ELIQUIS DVT/PE STARTER PACK) 5 MG TBPK Take 10 mg by mouth 2 times daily for 7 days, THEN 5 mg 2 times daily for 23 days., Disp-74 each, R-0, Local Print               Scribe Disclosure:  Nahomy WALKER  izabela Elam serving as a scribe at 8:37 PM on 5/2/2024 to document services personally performed by Heather Alberto DO based on my observations and the provider's statements to me.     [employer]      Heather Alberto DO  05/03/24 0130

## 2024-05-06 NOTE — TELEPHONE ENCOUNTER
Future Appointments   Date Time Provider Department Center   5/30/2024 10:00 AM Roberto Fan MD [facility]     [facility] [place] FAX    [phone]    Faxed on 5/6 at 11:33 am

## 2024-05-19 ENCOUNTER — APPOINTMENT (OUTPATIENT)
Dept: CT IMAGING | Facility: CLINIC | Age: 29
End: 2024-05-19
Attending: EMERGENCY MEDICINE
Payer: COMMERCIAL

## 2024-05-19 ENCOUNTER — HOSPITAL ENCOUNTER (EMERGENCY)
Facility: CLINIC | Age: 29
Discharge: HOME | End: 2024-05-19
Attending: EMERGENCY MEDICINE | Admitting: EMERGENCY MEDICINE
Payer: COMMERCIAL

## 2024-05-19 VITALS
OXYGEN SATURATION: 97 % | HEIGHT: 67 IN | DIASTOLIC BLOOD PRESSURE: 57 MMHG | WEIGHT: 293 LBS | RESPIRATION RATE: 16 BRPM | TEMPERATURE: 98.9 F | SYSTOLIC BLOOD PRESSURE: 118 MMHG | HEART RATE: 79 BPM | BODY MASS INDEX: 45.99 KG/M2

## 2024-05-19 DIAGNOSIS — M79.18 MUSCULOSKELETAL PAIN: ICD-10-CM

## 2024-05-19 PROBLEM — E05.00 GRAVES' DISEASE: Status: ACTIVE | Noted: 2023-10-16

## 2024-05-19 PROBLEM — E66.01 OBESITY, MORBID, BMI 40.0-49.9 (H): Status: ACTIVE | Noted: 2021-03-17

## 2024-05-19 PROBLEM — E66.9 OBESITY, UNSPECIFIED OBESITY SEVERITY, UNSPECIFIED OBESITY TYPE: Status: ACTIVE | Noted: 2023-08-17

## 2024-05-19 PROBLEM — M54.50 LOWER BACK PAIN: Status: ACTIVE | Noted: 2020-06-15

## 2024-05-19 PROBLEM — K80.20 GALLSTONES: Status: ACTIVE | Noted: 2021-10-11

## 2024-05-19 PROBLEM — E05.90 HYPERTHYROIDISM: Status: ACTIVE | Noted: 2023-08-17

## 2024-05-19 PROBLEM — D50.9 IRON DEFICIENCY ANEMIA: Status: ACTIVE | Noted: 2023-11-20

## 2024-05-19 PROBLEM — K21.9 GASTROESOPHAGEAL REFLUX DISEASE: Status: ACTIVE | Noted: 2021-10-11

## 2024-05-19 PROBLEM — K90.0 CELIAC DISEASE: Chronic | Status: ACTIVE | Noted: 2024-03-14

## 2024-05-19 LAB
ANION GAP SERPL CALCULATED.3IONS-SCNC: 10 MMOL/L (ref 7–15)
ATRIAL RATE - MUSE: 89 BPM
BUN SERPL-MCNC: 9 MG/DL (ref 6–20)
CALCIUM SERPL-MCNC: 8.7 MG/DL (ref 8.6–10)
CHLORIDE SERPL-SCNC: 105 MMOL/L (ref 98–107)
CREAT SERPL-MCNC: 0.74 MG/DL (ref 0.51–0.95)
DEPRECATED HCO3 PLAS-SCNC: 23 MMOL/L (ref 22–29)
DIASTOLIC BLOOD PRESSURE - MUSE: NORMAL MMHG
EGFRCR SERPLBLD CKD-EPI 2021: >90 ML/MIN/1.73M2
ERYTHROCYTE [DISTWIDTH] IN BLOOD BY AUTOMATED COUNT: 13.9 % (ref 10–15)
GLUCOSE SERPL-MCNC: 95 MG/DL (ref 70–99)
HCG SERPL QL: NEGATIVE
HCT VFR BLD AUTO: 36.8 % (ref 35–47)
HGB BLD-MCNC: 12.4 G/DL (ref 11.7–15.7)
INTERPRETATION ECG - MUSE: NORMAL
MCH RBC QN AUTO: 25.6 PG (ref 26.5–33)
MCHC RBC AUTO-ENTMCNC: 33.7 G/DL (ref 31.5–36.5)
MCV RBC AUTO: 76 FL (ref 78–100)
P AXIS - MUSE: 6 DEGREES
PLATELET # BLD AUTO: 284 10E3/UL (ref 150–450)
POTASSIUM SERPL-SCNC: 4.1 MMOL/L (ref 3.4–5.3)
PR INTERVAL - MUSE: 130 MS
QRS DURATION - MUSE: 92 MS
QT - MUSE: 372 MS
QTC - MUSE: 452 MS
R AXIS - MUSE: 7 DEGREES
RBC # BLD AUTO: 4.84 10E6/UL (ref 3.8–5.2)
SODIUM SERPL-SCNC: 138 MMOL/L (ref 135–145)
SYSTOLIC BLOOD PRESSURE - MUSE: NORMAL MMHG
T AXIS - MUSE: 22 DEGREES
TROPONIN T SERPL HS-MCNC: <6 NG/L
VENTRICULAR RATE- MUSE: 89 BPM
WBC # BLD AUTO: 6 10E3/UL (ref 4–11)

## 2024-05-19 PROCEDURE — 84703 CHORIONIC GONADOTROPIN ASSAY: CPT | Performed by: EMERGENCY MEDICINE

## 2024-05-19 PROCEDURE — 71275 CT ANGIOGRAPHY CHEST: CPT

## 2024-05-19 PROCEDURE — 99285 EMERGENCY DEPT VISIT HI MDM: CPT | Mod: 25

## 2024-05-19 PROCEDURE — 85048 AUTOMATED LEUKOCYTE COUNT: CPT | Performed by: EMERGENCY MEDICINE

## 2024-05-19 PROCEDURE — 250N000011 HC RX IP 250 OP 636: Performed by: EMERGENCY MEDICINE

## 2024-05-19 PROCEDURE — 93005 ELECTROCARDIOGRAM TRACING: CPT | Performed by: EMERGENCY MEDICINE

## 2024-05-19 PROCEDURE — 84484 ASSAY OF TROPONIN QUANT: CPT | Performed by: EMERGENCY MEDICINE

## 2024-05-19 PROCEDURE — 80048 BASIC METABOLIC PNL TOTAL CA: CPT | Performed by: EMERGENCY MEDICINE

## 2024-05-19 PROCEDURE — 36415 COLL VENOUS BLD VENIPUNCTURE: CPT | Performed by: EMERGENCY MEDICINE

## 2024-05-19 RX ORDER — IOPAMIDOL 755 MG/ML
75 INJECTION, SOLUTION INTRAVASCULAR ONCE
Status: COMPLETED | OUTPATIENT
Start: 2024-05-19 | End: 2024-05-19

## 2024-05-19 RX ADMIN — IOPAMIDOL 75 ML: 755 INJECTION, SOLUTION INTRAVENOUS at 19:44

## 2024-05-19 ASSESSMENT — ACTIVITIES OF DAILY LIVING (ADL)
ADLS_ACUITY_SCORE: 35
ADLS_ACUITY_SCORE: 35
ADLS_ACUITY_SCORE: 33

## 2024-05-19 ASSESSMENT — COLUMBIA-SUICIDE SEVERITY RATING SCALE - C-SSRS
2. HAVE YOU ACTUALLY HAD ANY THOUGHTS OF KILLING YOURSELF IN THE PAST MONTH?: NO
6. HAVE YOU EVER DONE ANYTHING, STARTED TO DO ANYTHING, OR PREPARED TO DO ANYTHING TO END YOUR LIFE?: NO
1. IN THE PAST MONTH, HAVE YOU WISHED YOU WERE DEAD OR WISHED YOU COULD GO TO SLEEP AND NOT WAKE UP?: NO

## 2024-05-19 NOTE — ED PROVIDER NOTES
EMERGENCY DEPARTMENT ENCOUNTER      NAME: Elizabeth Pope  AGE: 29 year old female  YOB: 1995  MRN: [id number]  EVALUATION DATE & TIME: 5/19/2024  5:26 PM    PCP: Sherrie Meza    ED PROVIDER: Naveed Loving M.D.      Chief Complaint   Patient presents with    Rib Pain         FINAL IMPRESSION:  1. Musculoskeletal pain          ED COURSE & MEDICAL DECISION MAKING:    Pertinent Labs & Imaging studies reviewed below.  All EKGs below represent my independent interpretation.   ED Course as of 05/20/24 1400   Sun May 19, 2024   1814 Patient is a 29-year-old woman who presents with pleuritic chest pain began today.  No fever, cough.  On arrival she is a blood pressure of 166/72, normal temperature.  Heart rate of 100, normal oxygenation and work of breathing.  She is comfortable appearing.  She has known DVT in the left common femoral, has been on Eliquis for last 2 weeks, only missing a single dose of Eliquis a couple days ago.  Differential includes pleurisy, pneumonia, PE.  PE would be unlikely given her anticoagulated state, however she did miss 1 dose.  I discussed risk and benefits of performing a performing a CT scan. Plan will be to obtain scan here to rule out PE (to r/o treatment failure)   1818 EKG shows sinus rhythm with a rate of 89.  No acute ischemic ST or T wave morphology.  Normal axis, normal intervals.  When compared to prior EKG on May 2, 2024, heart rate has slightly decreased.  Impression: Sinus rhythm with a rate of 89.   1856 Troponin T, High Sensitivity: <6   1857 Creatinine: 0.74   2021 CT Chest Pulmonary Embolism w Contrast  1.  No pulmonary embolism.  2.  Lungs are clear.   I discussed reassuring findings with the patient. Sx are likely MSK strain, no further workup needed. APAP PRN at home.     Additional ED Course Timestamps:  5:43 PM I met with the patient, obtained history, performed an initial exam, and discussed options and plan for diagnostics and treatment here in  "the ED.      Medical Decision Making  Obtained supplemental history:Supplemental history obtained?: Family Member/Significant Other  Reviewed external records: External records reviewed?: No  Care impacted by chronic illness:Anticoagulated State and Other: Hyperthyroidism, obesity  Care significantly affected by social determinants of health:N/A  Did you consider but not order tests?: Work up considered but not performed and documented in chart, if applicable  Did you interpret images independently?: Independent interpretation of ECG and images noted in documentation, when applicable.  Consultation discussion with other provider: Phone conversation with consultants will be documented in the ED Course  Discharge. No recommendations on prescription strength medication(s). N/A.      At the conclusion of the encounter I discussed the results of all of the tests and the disposition. The questions were answered. The patient or family acknowledged understanding and was agreeable with the care plan.     MEDICATIONS GIVEN IN THE EMERGENCY:  Medications   iopamidol (ISOVUE-370) solution 75 mL (75 mLs Intravenous $Given 5/19/24 1944)         NEW PRESCRIPTIONS STARTED AT TODAY'S ER VISIT  Discharge Medication List as of 5/19/2024  8:26 PM             =================================================================    HPI    Elizabeth Pope is a 29 year old female who presents to this ED for evaluation of right sided rib pain.     Patient reports experiencing a dull, achy sensation in her right rib starting this morning. She notes that the pain worsens with inhalation, and does not radiate anywhere else. She currently rates the pain a 4-5/10. She notes that she had a blood clot in her left groin about 3 weeks ago, and has been taking Eliquis since. No other complaints at this time.       VITALS:  /57   Pulse 79   Temp 98.9  F (37.2  C) (Oral)   Resp 16   Ht 1.702 m (5' 7\")   Wt 136.1 kg (300 lb)   LMP 04/23/2024   " SpO2 97%   BMI 46.99 kg/m      PHYSICAL EXAM    Constitutional: Well developed, well nourished. Comfortable appearing.  HENT: Atraumatic, mucous membranes moist, nose normal. Neck- Supple, gross ROM intact.   Eyes: Pupils mid-range, conjunctiva without injection, no discharge.   Respiratory: Clear to auscultation bilaterally, no respiratory distress, no wheezing, speaks full sentences easily. No cough.  Cardiovascular: Normal heart rate, regular rhythm, no murmurs.   GI: Soft, no tenderness to deep palpation in all quadrants, no masses.  Musculoskeletal: Moving all 4 extremities intentionally and without pain. No obvious deformity.  Skin: Warm, dry, no rash.  Neurologic: Alert & oriented x 3, cranial nerves grossly intact.  Psychiatric: Affect normal, cooperative.        I, Skyla Selby am serving as a scribe to document services personally performed by Dr. Naveed Loving based on my observation and the provider's statements to me. INaveed MD attest that Skyla Selby is acting in a scribe capacity, has observed my performance of the services and has documented them in accordance with my direction.    Naveed Loving M.D.  Emergency Medicine  [facility] EMERGENCY ROOM  [id number] [id number] [phone]  Dept: [phone]       Naveed Loving MD  05/20/24 5872

## 2024-05-19 NOTE — ED TRIAGE NOTES
Patient presents to ED with pain to R lower rib area that she has had since she woke up this morning, denies injury, more pain with inspiration, standing or walking, pt diagnosed with blood clot in groin area 2.5 weeks ago and has been taking Eliquis since then.  Sierra Guzman RN.......5/19/2024 5:25 PM     Triage Assessment (Adult)       Row Name 05/19/24 1723          Triage Assessment    Airway WDL WDL        Respiratory WDL    Respiratory WDL X  pain in R rib area with breathing        Skin Circulation/Temperature WDL    Skin Circulation/Temperature WDL WDL        Cardiac WDL    Cardiac WDL WDL        Peripheral/Neurovascular WDL    Peripheral Neurovascular WDL WDL        Cognitive/Neuro/Behavioral WDL    Cognitive/Neuro/Behavioral WDL WDL

## 2024-05-20 NOTE — DISCHARGE INSTRUCTIONS
You have a strain of the chest wall or abdominal wall musculature.  No blood clot, pneumonia, or any other dangerous cause of your symptoms.  I recommend Tylenol as needed for discomfort.  You can take 1000 mg 3 times a day.

## 2024-05-30 ENCOUNTER — OFFICE VISIT (OUTPATIENT)
Dept: OTHER | Facility: CLINIC | Age: 29
End: 2024-05-30
Attending: INTERNAL MEDICINE
Payer: COMMERCIAL

## 2024-05-30 VITALS
DIASTOLIC BLOOD PRESSURE: 81 MMHG | HEART RATE: 90 BPM | SYSTOLIC BLOOD PRESSURE: 116 MMHG | HEIGHT: 67 IN | OXYGEN SATURATION: 98 % | WEIGHT: 293 LBS | BODY MASS INDEX: 45.99 KG/M2

## 2024-05-30 DIAGNOSIS — I82.412 ACUTE DEEP VEIN THROMBOSIS (DVT) OF FEMORAL VEIN OF LEFT LOWER EXTREMITY (H): Primary | ICD-10-CM

## 2024-05-30 DIAGNOSIS — E66.01 MORBID OBESITY (H): ICD-10-CM

## 2024-05-30 DIAGNOSIS — K90.0 CELIAC DISEASE: Chronic | ICD-10-CM

## 2024-05-30 DIAGNOSIS — R60.9 LIPEDEMA: ICD-10-CM

## 2024-05-30 DIAGNOSIS — E55.9 VITAMIN D DEFICIENCY: ICD-10-CM

## 2024-05-30 DIAGNOSIS — K21.9 GASTROESOPHAGEAL REFLUX DISEASE WITHOUT ESOPHAGITIS: ICD-10-CM

## 2024-05-30 DIAGNOSIS — E05.00 GRAVES' DISEASE: ICD-10-CM

## 2024-05-30 DIAGNOSIS — M24.80 JOINT HYPEREXTENSIBILITY OF MULTIPLE SITES: ICD-10-CM

## 2024-05-30 DIAGNOSIS — R23.3 EASY BRUISING: ICD-10-CM

## 2024-05-30 PROCEDURE — 99213 OFFICE O/P EST LOW 20 MIN: CPT | Performed by: INTERNAL MEDICINE

## 2024-05-30 PROCEDURE — 99205 OFFICE O/P NEW HI 60 MIN: CPT | Performed by: INTERNAL MEDICINE

## 2024-05-30 PROCEDURE — G2211 COMPLEX E/M VISIT ADD ON: HCPCS | Performed by: INTERNAL MEDICINE

## 2024-05-30 RX ORDER — METHIMAZOLE 10 MG/1
10 TABLET ORAL
COMMUNITY

## 2024-05-30 NOTE — PROGRESS NOTES
[facility] INITIAL VASCULAR MEDICINE CONSULT    ( New patient Visit)     PRIMARY HEALTH CARE PROVIDER:  Sherrie Meza MD      REFERRING HEALTH CARE PROVIDER;  Heather Alberto DO      REASON FOR CONSULT: Evaluation and management of first lifetime thromboembolic event left lower extremity DVT of femoral vein diagnosed on May 2, 2024 currently on anticoagulation.      HPI: Elizabeth Pope is a 29 year old very pleasant female morbidly obese BMI greater than 48 developed left lower extremity swelling pain discomfort and she was sedentary prior to that mostly sitting job underwent workup revealed left femoral vein deep venous thrombosis and no PE.  Risk factors are morbid obesity and sedentary lifestyle.  She is non-smoker, no personal history of malignancy no family history of hypercoagulable status no recent travel no recent surgery or hospitalization and no recent COVID infection or COVID vaccination.  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  She also gives history of easy bruising hyperextensibility of the joints and tender to touch both legs and lower portion of the body disproportionately larger than upper portion after pregnancy childbirth and she has a 3-year-old child.  She gives a history of puberty around age 11.  Multiple of her first-degree and second-degree family members mainly mother, grandmother and aunt similar body features.  She has a history of a celiac disease, Graves' disease currently on treatment following up with endocrinologist.    She is new to me reviewed available records in epic and updated chart        PAST MEDICAL HISTORY  Past Medical History:   Diagnosis Date    Celiac disease     DVT (deep venous thrombosis) (H) LLE [facility] 5/2/24     Graves' disease     JEFF (iron deficiency anemia)     Infection due to 2019 novel coronavirus in 2021     Morbid obesity (H)     Vitamin D deficiency        CURRENT MEDICATIONS  Current Outpatient Medications    Medication Sig Dispense Refill    Apixaban Starter Pack (ELIQUIS DVT/PE STARTER PACK) 5 MG TBPK Take 10 mg by mouth 2 times daily for 7 days, THEN 5 mg 2 times daily for 23 days. 74 each 0    Ascorbic Acid (VITAMIN C PO)       famotidine (PEPCID) 20 MG tablet Take 20 mg by mouth      methimazole (TAPAZOLE) 10 MG tablet Take 10 mg by mouth      ondansetron (ZOFRAN-ODT) 8 MG ODT tab Take 1 tablet (8 mg) by mouth every 8 hours as needed for nausea 12 tablet 0    ZINC ASPARTATE PO        No current facility-administered medications for this visit.       PAST SURGICAL HISTORY:  Past Surgical History:   Procedure Laterality Date    CHOLECYSTECTOMY      OTHER SURGICAL HISTORY      tonsillectomyand adenoidectomy    OTHER SURGICAL HISTORY      Breast reduction       ALLERGIES     Allergies   Allergen Reactions    Vancomycin Anaphylaxis       FAMILY HISTORY  Family History   Problem Relation Age of Onset    Hypertension Father     Cirrhosis Maternal Grandmother     Hypothyroidism Paternal Grandmother        VASCULAR FAMILY HISTORY  1st order relative with atherosclerotic PAD: No  1st order relative with AAA: No  Family history of Familial Hyperlipidemia No  Family History of Hypercoagulable state:No    VASCULAR RISK FACTORS  1. Diabetes:No   2. Smoking: has never smoked.  3. HTN: normotensive  4.Hyperlipidemia: NO      SOCIAL HISTORY  Social History     Socioeconomic History    Marital status:      Spouse name: Not on file    Number of children: Not on file    Years of education: Not on file    Highest education level: Not on file   Occupational History    Not on file   Tobacco Use    Smoking status: Never    Smokeless tobacco: Not on file   Substance and Sexual Activity    Alcohol use: Yes     Comment: occ    Drug use: Yes     Types: Marijuana     Comment: occ gummies    Sexual activity: Not on file   Other Topics Concern    Not on file   Social History Narrative    Not on file     Social Determinants of Health  "    Financial Resource Strain: Not on file   Food Insecurity: Not on file   Transportation Needs: Not on file   Physical Activity: Not on file   Stress: Not on file   Social Connections: Not on file   Interpersonal Safety: Not At Risk (7/3/2023)    [facility] from [facility] and [facility]     IP Custom IPV     Do you feel UNSAFE in any of your personal relationships with your family members or any other acquaintances?: No   Housing Stability: Not on file       ROS:   General: No change in weight, sleep or appetite.  Normal energy.  No fever or chills  Eyes: Negative for vision changes or eye problems  ENT: No problems with ears, nose or throat.  No difficulty swallowing.  Resp: No coughing, wheezing or shortness of breath  CV: No chest pains or palpitations  GI: No nausea, vomiting,  heartburn, abdominal pain, diarrhea, constipation or change in bowel habits  : No urinary frequency or dysuria, bladder or kidney problems  Musculoskeletal: No significant muscle or joint pains  Neurologic: No headaches, numbness, tingling, weakness, problems with balance or coordination  Psychiatric: No problems with anxiety, depression or mental health  Heme/immune/allergy: No history of bleeding or clotting problems or anemia.  No allergies or immune system problems  Endocrine: No history of thyroid disease, diabetes or other endocrine disorders  Skin: No rashes,worrisome lesions or skin problems  Vascular: Recent history of left lower extremity DVT currently on anticoagulation  Morbidly obese with lower portion of the body disproportionately larger than upper portion  Tender to touch, easy bruising and hyperextensibility of the joints but no dislocations    EXAM:  /81 (BP Location: Left arm, Patient Position: Chair, Cuff Size: Adult Large)   Pulse 90   Ht 5' 7\" (1.702 m)   Wt 308 lb 12.8 oz (140.1 kg)   LMP 04/23/2024   SpO2 98%   BMI 48.36 kg/m    In general, the patient is a pleasant female in " no apparent distress.    HEENT: NC/AT.  PERRLA.  EOMI.  Sclerae white, not injected.  Nares clear.  Pharynx without erythema or exudate.  Dentition intact.    Neck: No adenopathy.  No thyromegaly. Carotids +2/2 bilaterally without bruits.  No jugular venous distension.   Heart: RRR. Normal S1, S2 splits physiologically. No murmur, rub, click, or gallop. The PMI is in the 5th ICS in the midclavicular line. There is no heave.    Lungs: CTA.  No ronchi, wheezes, rales.  No dullness to percussion.   Abdomen: Soft, nontender, nondistended. No organomegaly. No AAA.  No bruits.   Extremities: Vascular:  Lower portion of the body disproportionately larger than upper portion of the body  Lumpy bumpy with mattress phenomenon of buttocks and thighs  Good palpable peripheral pulses  Swelling stops at ankle area  Classical features of lipedema  Upper arms are disproportionately larger than forearms  Hyperextensibility of the joints      Labs:    LIVER ENZYME RESULTS:  Lab Results   Component Value Date    AST 19 10/18/2021    ALT 20 10/18/2021       CBC RESULTS:  Lab Results   Component Value Date    WBC 6.0 05/19/2024    RBC 4.84 05/19/2024    HGB 12.4 05/19/2024    HCT 36.8 05/19/2024    MCV 76 (L) 05/19/2024    MCH 25.6 (L) 05/19/2024    MCHC 33.7 05/19/2024    RDW 13.9 05/19/2024     05/19/2024       BMP RESULTS:  Lab Results   Component Value Date     05/19/2024    POTASSIUM 4.1 05/19/2024    POTASSIUM 3.7 10/18/2021    CHLORIDE 105 05/19/2024    CHLORIDE 109 (H) 10/18/2021    CO2 23 05/19/2024    CO2 20 (L) 10/18/2021    ANIONGAP 10 05/19/2024    ANIONGAP 13 10/18/2021    GLC 95 05/19/2024    GLC 99 10/18/2021    BUN 9.0 05/19/2024    BUN 8 10/18/2021    CR 0.74 05/19/2024    GFRESTIMATED >90 05/19/2024    GFRESTIMATED >60 03/22/2021    GFRESTBLACK >60 03/22/2021    CHITO 8.7 05/19/2024          Procedures:     EXAM: US LOWER EXTREMITY VENOUS DUPLEX LEFT  LOCATION: [facility]  DATE:  5/2/2024     INDICATION: Left calf pain, evaluate for DVT  COMPARISON: None.  TECHNIQUE: Venous Duplex ultrasound of the left lower extremity with and without compression, augmentation and duplex. Color flow and spectral Doppler with waveform analysis performed.     FINDINGS: Exam includes the common femoral, femoral, popliteal, and contralateral common femoral veins as well as segmentally visualized deep calf veins and greater saphenous vein.      LEFT: There is a small amount of nonocclusive DVT in the common femoral vein adjacent to the saphenofemoral junction. No superficial thrombophlebitis. No popliteal cyst.                                                                      IMPRESSION:  1.  Small amount of nonocclusive common femoral vein thrombus adjacent to the saphenofemoral junction.        Dr. Garza discussed these results over the phone with Dr. Alberto at 9:35 PM on 5/2/2024.      EXAM: CT CHEST PULMONARY EMBOLISM W CONTRAST  LOCATION: [facility]  DATE: 5/19/2024     INDICATION: right chest pleuritic pain, known DVT  COMPARISON: None.  TECHNIQUE: CT chest pulmonary angiogram during arterial phase injection of IV contrast. Multiplanar reformats and MIP reconstructions were performed. Dose reduction techniques were used.   CONTRAST: 75ml Isovue 370     FINDINGS:  ANGIOGRAM CHEST: Pulmonary arteries are normal caliber and negative for pulmonary emboli. Thoracic aorta is negative for dissection. No CT evidence of right heart strain.     LUNGS AND PLEURA: Lungs are clear. No pleural effusion.     MEDIASTINUM/AXILLAE: Heart is normal in size. Lungs are clear.     CORONARY ARTERY CALCIFICATION: None.     UPPER ABDOMEN: Normal.     MUSCULOSKELETAL: Normal.                                                                      IMPRESSION:  1.  No pulmonary embolism.  2.  Lungs are clear.      Assessment and Plan:     1. Deep vein thrombosis (DVT) of femoral vein of left lower extremity  (H) 5/2/2024 ( No PE ) First Life time event  - Vascular Medicine Referral  - apixaban ANTICOAGULANT (ELIQUIS) 5 MG tablet; Take 1 tablet (5 mg) by mouth 2 times daily  Dispense: 180 tablet; Refill: 1    2. Graves' disease    3. Vitamin D deficiency    4. Morbid obesity (H)    5. Celiac disease    6. Gastroesophageal reflux disease without esophagitis    7. Lipedema    8. Easy bruising    9. Joint hyperextensibility of multiple sites      This is a very pleasant 29-year-old female developed first lifetime thromboembolic event of left lower extremity DVT probably provoked with risk factors of morbid obesity and sedentary lifestyle taking Eliquis and tolerating without any problems.  She is not using any compression stockings.  No family history of hypercoagulable status and no personal history of malignancy and no recent travel no recent COVID vaccination or COVID infection and no recent hospitalization or surgeries.  Her exam is classical for lipedema with tender to touch, lumpy bumpy and mattress phenomenon of fatty tissue and lower part of the body is disproportionately larger than upper part of the body and upper arms are larger than the forearms and hyperextensibility of the joints with ongoing leg fatigue heaviness tiredness etc..  We discussed about the lipedema and its long-term management of the patient  We also discussed about the DVT and long-term management    Suggested patient utilize the compression stockings given her body habitus she will benefit with bioflect compression pants information given    Continue Eliquis 5 mg twice a day refill the medication, coupons given  Will obtain left lower extremity venous duplex and D-dimer lab test in November 1 week then followed by office visit    I have reviewed [email or web address] information on multimodality approach of management of lipedema, she took a picture on her cell phone.    Mainly improve the lymphatic flow with compression, MLD, dry brush, vibrate  and eventually pump therapy  Anti-inflammatory diet/paleo diet  Join the support groups  Pool exercises, walking, lymphatic yoga, rebounding, Pilates  Muscle toning exercises           60 minutes spent on the date of the encounter doing chart review, history and exam, documentation, and further activities as noted above.    The longitudinal care of plan for the above diagnoses was addressed during this visit. Due to added complexity of care, we will continue to supprt Elizabeth Pope and the subsequent management of this/these conditions and with ongoing continuity of care for this/these conditions.     Thank you for the consultation    This note was dictated by utilizing dragon software    Copy of this note to primary care physician and referring physician      Roberto Fan MD,FAHA,FSVM,FNLA, FACP  Vascular Medicine  Clinical Hypertension Specialist   Clinical Lipidologist

## 2024-05-30 NOTE — PATIENT INSTRUCTIONS
Bioflect compression  Pants wear during day time , check on [email or web address]     Continue eliqiuis 5 mg twice a day for next 5 months Refill sent     Plan for repeat Left leg venous duplex , D dimer in November  First week then visit     Water aerobics, antiinflammatory diet, walking , piliates, vibration         Courtesy from [email or web address]

## 2024-05-30 NOTE — PROGRESS NOTES
"Patient is here to discuss consult    /73 (BP Location: Right arm, Patient Position: Chair, Cuff Size: Adult Large)   Pulse 87   Ht 5' 7\" (1.702 m)   Wt 308 lb 12.8 oz (140.1 kg)   LMP 04/23/2024   SpO2 98%   BMI 48.36 kg/m      Questions patient would like addressed today are: N/A.    Refills are needed: No    Has homecare services and agency name:  Jennifer JIMÉNEZ    "

## 2024-06-11 ENCOUNTER — APPOINTMENT (OUTPATIENT)
Dept: CT IMAGING | Facility: CLINIC | Age: 29
End: 2024-06-11
Attending: EMERGENCY MEDICINE
Payer: COMMERCIAL

## 2024-06-11 ENCOUNTER — HOSPITAL ENCOUNTER (EMERGENCY)
Facility: CLINIC | Age: 29
Discharge: HOME | End: 2024-06-11
Attending: EMERGENCY MEDICINE | Admitting: EMERGENCY MEDICINE
Payer: COMMERCIAL

## 2024-06-11 VITALS
SYSTOLIC BLOOD PRESSURE: 140 MMHG | DIASTOLIC BLOOD PRESSURE: 90 MMHG | BODY MASS INDEX: 45.99 KG/M2 | TEMPERATURE: 98 F | WEIGHT: 293 LBS | OXYGEN SATURATION: 98 % | RESPIRATION RATE: 26 BRPM | HEART RATE: 123 BPM | HEIGHT: 67 IN

## 2024-06-11 DIAGNOSIS — R10.13 EPIGASTRIC PAIN: ICD-10-CM

## 2024-06-11 DIAGNOSIS — R07.89 ATYPICAL CHEST PAIN: ICD-10-CM

## 2024-06-11 DIAGNOSIS — R11.2 NAUSEA AND VOMITING, UNSPECIFIED VOMITING TYPE: ICD-10-CM

## 2024-06-11 DIAGNOSIS — K52.9 ACUTE GASTROENTERITIS: ICD-10-CM

## 2024-06-11 LAB
ALBUMIN SERPL BCG-MCNC: 4.1 G/DL (ref 3.5–5.2)
ALP SERPL-CCNC: 72 U/L (ref 40–150)
ALT SERPL W P-5'-P-CCNC: 27 U/L (ref 0–50)
ANION GAP SERPL CALCULATED.3IONS-SCNC: 10 MMOL/L (ref 7–15)
AST SERPL W P-5'-P-CCNC: 40 U/L (ref 0–45)
BASOPHILS # BLD AUTO: 0 10E3/UL (ref 0–0.2)
BASOPHILS NFR BLD AUTO: 0 %
BILIRUB SERPL-MCNC: 1 MG/DL
BUN SERPL-MCNC: 10.1 MG/DL (ref 6–20)
CALCIUM SERPL-MCNC: 8.4 MG/DL (ref 8.6–10)
CHLORIDE SERPL-SCNC: 105 MMOL/L (ref 98–107)
CREAT SERPL-MCNC: 0.77 MG/DL (ref 0.51–0.95)
DEPRECATED HCO3 PLAS-SCNC: 24 MMOL/L (ref 22–29)
EGFRCR SERPLBLD CKD-EPI 2021: >90 ML/MIN/1.73M2
EOSINOPHIL # BLD AUTO: 0 10E3/UL (ref 0–0.7)
EOSINOPHIL NFR BLD AUTO: 0 %
ERYTHROCYTE [DISTWIDTH] IN BLOOD BY AUTOMATED COUNT: 13.7 % (ref 10–15)
GLUCOSE SERPL-MCNC: 107 MG/DL (ref 70–99)
HCT VFR BLD AUTO: 35.6 % (ref 35–47)
HGB BLD-MCNC: 12.2 G/DL (ref 11.7–15.7)
IMM GRANULOCYTES # BLD: 0 10E3/UL
IMM GRANULOCYTES NFR BLD: 0 %
INR PPP: 1.02 (ref 0.85–1.15)
LIPASE SERPL-CCNC: 17 U/L (ref 13–60)
LYMPHOCYTES # BLD AUTO: 0.6 10E3/UL (ref 0.8–5.3)
LYMPHOCYTES NFR BLD AUTO: 10 %
MCH RBC QN AUTO: 25.9 PG (ref 26.5–33)
MCHC RBC AUTO-ENTMCNC: 34.3 G/DL (ref 31.5–36.5)
MCV RBC AUTO: 76 FL (ref 78–100)
MONOCYTES # BLD AUTO: 0.5 10E3/UL (ref 0–1.3)
MONOCYTES NFR BLD AUTO: 8 %
NEUTROPHILS # BLD AUTO: 5.4 10E3/UL (ref 1.6–8.3)
NEUTROPHILS NFR BLD AUTO: 82 %
NRBC # BLD AUTO: 0 10E3/UL
NRBC BLD AUTO-RTO: 0 /100
PLATELET # BLD AUTO: 238 10E3/UL (ref 150–450)
POTASSIUM SERPL-SCNC: 4.1 MMOL/L (ref 3.4–5.3)
PROT SERPL-MCNC: 7.1 G/DL (ref 6.4–8.3)
RBC # BLD AUTO: 4.71 10E6/UL (ref 3.8–5.2)
SODIUM SERPL-SCNC: 139 MMOL/L (ref 135–145)
TROPONIN T SERPL HS-MCNC: <6 NG/L
WBC # BLD AUTO: 6.5 10E3/UL (ref 4–11)

## 2024-06-11 PROCEDURE — 99285 EMERGENCY DEPT VISIT HI MDM: CPT | Mod: 25

## 2024-06-11 PROCEDURE — 85610 PROTHROMBIN TIME: CPT | Performed by: EMERGENCY MEDICINE

## 2024-06-11 PROCEDURE — 258N000003 HC RX IP 258 OP 636: Mod: JZ | Performed by: EMERGENCY MEDICINE

## 2024-06-11 PROCEDURE — 85025 COMPLETE CBC W/AUTO DIFF WBC: CPT | Performed by: EMERGENCY MEDICINE

## 2024-06-11 PROCEDURE — 96360 HYDRATION IV INFUSION INIT: CPT | Mod: 59

## 2024-06-11 PROCEDURE — 71275 CT ANGIOGRAPHY CHEST: CPT

## 2024-06-11 PROCEDURE — 84484 ASSAY OF TROPONIN QUANT: CPT | Performed by: EMERGENCY MEDICINE

## 2024-06-11 PROCEDURE — 80053 COMPREHEN METABOLIC PANEL: CPT | Performed by: EMERGENCY MEDICINE

## 2024-06-11 PROCEDURE — 74177 CT ABD & PELVIS W/CONTRAST: CPT

## 2024-06-11 PROCEDURE — 83690 ASSAY OF LIPASE: CPT | Performed by: EMERGENCY MEDICINE

## 2024-06-11 PROCEDURE — 36415 COLL VENOUS BLD VENIPUNCTURE: CPT | Performed by: EMERGENCY MEDICINE

## 2024-06-11 PROCEDURE — 250N000011 HC RX IP 250 OP 636: Mod: JZ | Performed by: EMERGENCY MEDICINE

## 2024-06-11 RX ORDER — ONDANSETRON 4 MG/1
4-8 TABLET, ORALLY DISINTEGRATING ORAL EVERY 8 HOURS PRN
Qty: 10 TABLET | Refills: 0 | Status: SHIPPED | OUTPATIENT
Start: 2024-06-11 | End: 2024-06-14

## 2024-06-11 RX ORDER — IOPAMIDOL 755 MG/ML
90 INJECTION, SOLUTION INTRAVASCULAR ONCE
Status: COMPLETED | OUTPATIENT
Start: 2024-06-11 | End: 2024-06-11

## 2024-06-11 RX ADMIN — SODIUM CHLORIDE 1000 ML: 9 INJECTION, SOLUTION INTRAVENOUS at 02:29

## 2024-06-11 RX ADMIN — IOPAMIDOL 90 ML: 755 INJECTION, SOLUTION INTRAVENOUS at 03:13

## 2024-06-11 ASSESSMENT — ACTIVITIES OF DAILY LIVING (ADL)
ADLS_ACUITY_SCORE: 35
ADLS_ACUITY_SCORE: 35
ADLS_ACUITY_SCORE: 33

## 2024-06-11 ASSESSMENT — COLUMBIA-SUICIDE SEVERITY RATING SCALE - C-SSRS
2. HAVE YOU ACTUALLY HAD ANY THOUGHTS OF KILLING YOURSELF IN THE PAST MONTH?: NO
1. IN THE PAST MONTH, HAVE YOU WISHED YOU WERE DEAD OR WISHED YOU COULD GO TO SLEEP AND NOT WAKE UP?: NO
6. HAVE YOU EVER DONE ANYTHING, STARTED TO DO ANYTHING, OR PREPARED TO DO ANYTHING TO END YOUR LIFE?: NO

## 2024-06-11 NOTE — ED TRIAGE NOTES
Patient arrives to the ER with chest pain, shortness of breath and N/V and dizziness.  Patient started off feeling dizzy. She then became nauseated, had a bout of emesis and then experienced sharp, stabbing chest pain with shortness of breath.. She has a known clot in her groin and is currently on Eliquis.     Triage Assessment (Adult)       Row Name 06/11/24 0131          Triage Assessment    Airway WDL WDL        Respiratory WDL    Respiratory WDL X;rhythm/pattern     Rhythm/Pattern, Respiratory shortness of breath        Skin Circulation/Temperature WDL    Skin Circulation/Temperature WDL WDL        Cardiac WDL    Cardiac WDL X;chest pain        Chest Pain Assessment    Chest Pain Location midsternal     Chest Pain Radiation shoulder     Character sharp;stabbing     Chest Pain Intervention 12-lead ECG obtained        Peripheral/Neurovascular WDL    Peripheral Neurovascular WDL WDL        Cognitive/Neuro/Behavioral WDL    Cognitive/Neuro/Behavioral WDL WDL        [place] Coma Scale    Best Eye Response 4-->(E4) spontaneous     Best Motor Response 6-->(M6) obeys commands     Best Verbal Response 5-->(V5) oriented     [place] Coma Scale Score 15

## 2024-06-11 NOTE — ED PROVIDER NOTES
EMERGENCY DEPARTMENT ENCOUnter      NAME: Elizabeth Pope  AGE: 29 year old female  YOB: 1995  MRN: [id number]  EVALUATION DATE & TIME: 6/11/2024  1:36 AM    PCP: Sherrie Meza    ED PROVIDER: Alisha Solorio MD      Chief Complaint   Patient presents with    Chest Pain    Shortness of Breath    Nausea & Vomiting         FINAL IMPRESSION:  1. Nausea and vomiting, unspecified vomiting type    2. Atypical chest pain    3. Epigastric pain    4. Acute gastroenteritis          ED COURSE & MEDICAL DECISION MAKING:      In summary, the patient is a 29-year-old female that presents to the emergency department for evaluation of chest and abdominal pain, nausea and vomiting thought secondary to a gastroenteritis.  She has no evidence of thromboembolism, ACS, bowel obstruction or bowel perforation.  We will treat symptomatically as an outpatient.    0143 - I met the patient and performed my initial interview and exam.  Normal saline 1 L IV was administered for IV hydration.  0347 - Rechecked and updated patient on labs and imaging.  Symptoms are resolved.  Patient is comfortable with discharge.    Medical Decision Making  Obtained supplemental history:Supplemental history obtained?: No  Reviewed external records: External records reviewed?: No  Care impacted by chronic illness:Anticoagulated State and Other: Celiac disease  Care significantly affected by social determinants of health:N/A  Did you consider but not order tests?: Work up considered but not performed and documented in chart, if applicable  Did you interpret images independently?: Independent interpretation of ECG and images noted in documentation, when applicable.  Consultation discussion with other provider:Did you involve another provider (consultant, MH, pharmacy, etc.)?: No  Discharge. I prescribed additional prescription strength medication(s) as charted. See documentation for any additional details.      At the conclusion of the  encounter I discussed the results of all of the tests and the disposition. The questions were answered. The patient or family acknowledged understanding and was agreeable with the care plan.         MEDICATIONS GIVEN IN THE EMERGENCY:  Medications   sodium chloride 0.9% BOLUS 1,000 mL (1,000 mLs Intravenous $New Bag 6/11/24 0229)   iopamidol (ISOVUE-370) solution 90 mL (90 mLs Intravenous $Given 6/11/24 0313)       NEW PRESCRIPTIONS STARTED AT TODAY'S ER VISIT  New Prescriptions    ONDANSETRON (ZOFRAN ODT) 4 MG ODT TAB    Take 1-2 tablets (4-8 mg) by mouth every 8 hours as needed for nausea or vomiting          =================================================================    HPI        Elizabeth Pope is a 29 year old female with a pertinent history of Celiac disease, GERD, who presents to this ED via walk-in for evaluation of chest pain, shortness of breath.    Yesterday at 9 PM, the patient had an onset of sudden dizziness with chills. She went to bed at that time and slept for about 1.5-2 hours before waking with nausea and dizziness, proceeding to vomit a few times. She went back to sleep and woke at about 12:45 AM today with stabbing substernal chest pain that radiated to her back between her shoulder blades. She vomited at that time and was generally feeling feverish.    Last week, the patient's  had symptoms of food poisoning. The patient denies abdominal pain, dysuria, hematuria, diarrhea, and constipation.    The patient has a history of a blood clot in her left groin, and she is on Eliquis. She has never had a clot in her chest. She has a history of Celiac disease and Grave's disease. Her thyroid was checked 3 weeks ago. She is allergic to vancomycin.     The patient rarely drinks and does not smoke.      REVIEW OF SYSTEMS     Constitutional:  Denies fever. Endorses feeling feverish and chills.  HENT:  Denies sore throat   Respiratory:  Denies cough or shortness of breath   Cardiovascular:   Denies palpitations. Endorses substernal chest pain radiating to her back.  GI:  Denies abdominal pain. Endorses nausea and vomiting. Denies diarrhea and constipation.  : Denies dysuria and hematuria.  Musculoskeletal:  Denies any new extremity pain   Skin:  Denies rash   Neurologic:  Denies headache, focal weakness. Endorses dizziness.    All other systems reviewed and are negative      PAST MEDICAL HISTORY:  Past Medical History:   Diagnosis Date    Celiac disease     DVT (deep venous thrombosis) (H) LLE [facility] 5/2/24     Graves' disease     JEFF (iron deficiency anemia)     Infection due to 2019 novel coronavirus in 2021     Morbid obesity (H)     Vitamin D deficiency        PAST SURGICAL HISTORY:  Past Surgical History:   Procedure Laterality Date    CHOLECYSTECTOMY      OTHER SURGICAL HISTORY      tonsillectomyand adenoidectomy    OTHER SURGICAL HISTORY      Breast reduction           CURRENT MEDICATIONS:    ondansetron (ZOFRAN ODT) 4 MG ODT tab  apixaban ANTICOAGULANT (ELIQUIS) 5 MG tablet  Ascorbic Acid (VITAMIN C PO)  famotidine (PEPCID) 20 MG tablet  methimazole (TAPAZOLE) 10 MG tablet  ondansetron (ZOFRAN-ODT) 8 MG ODT tab  ZINC ASPARTATE PO        ALLERGIES:  Allergies   Allergen Reactions    Vancomycin Anaphylaxis       FAMILY HISTORY:  Family History   Problem Relation Age of Onset    Hypertension Father     Cirrhosis Maternal Grandmother     Hypothyroidism Paternal Grandmother        SOCIAL HISTORY:   Social History     Socioeconomic History    Marital status:    Tobacco Use    Smoking status: Never   Substance and Sexual Activity    Alcohol use: Yes     Comment: occ    Drug use: Yes     Types: Marijuana     Comment: occ gummies     Social Determinants of Health     Interpersonal Safety: Not At Risk (7/3/2023)    Received from [facility]     IP Custom IPV     Do you feel UNSAFE in any of your personal relationships with your family members or any other  "acquaintances?: No       VITALS:  Patient Vitals for the past 24 hrs:   BP Temp Temp src Pulse Resp SpO2 Height Weight   06/11/24 0129 (!) 140/90 98  F (36.7  C) Oral (!) 123 26 98 % 1.702 m (5' 7\") 136.1 kg (300 lb)       PHYSICAL EXAM    Constitutional:  Well developed, Well nourished,  HENT:  Normocephalic, Atraumatic, Bilateral external ears normal, Oropharynx moist, Nose normal.   Neck:  Normal range of motion, No meningismus, No stridor.   Eyes:  EOMI, Conjunctiva normal, No discharge.   Respiratory:  Normal breath sounds, No respiratory distress, No wheezing, No chest tenderness.   Cardiovascular:  Normal heart rate, Normal rhythm, No murmurs  GI:  Soft, diffuse mild tenderness, No guarding,   Musculoskeletal:  Neurovascularly intact distally, No edema, No tenderness, No cyanosis, Good range of motion in all major joints.    Integument:  Warm, Dry, No erythema, No rash.   Lymphatic:  No lymphadenopathy noted.   Neurologic:  Alert & oriented x 3, Normal motor function, No focal deficits noted.   Psychiatric:  Affect normal, Judgment normal, Mood normal.      LAB:  All pertinent labs reviewed and interpreted.  Results for orders placed or performed during the hospital encounter of 06/11/24   CT Chest Pulmonary Embolism w Contrast    Impression    IMPRESSION:  1.  Nonoptimal opacification of the mid and distal pulmonary arteries. No central or central subsegmental pulmonary embolism.  2.  No acute airspace opacity.  3.  Borderline splenomegaly.     CT Abdomen Pelvis w Contrast    Impression    IMPRESSION:   1.  4.1 x 4.0 x 4.6 cm right adnexal/right ovarian cyst. Ultrasound could be performed to further characterize this lesion.  2.  Appendix within normal limits.  3.  Mild splenomegaly.     Result Value Ref Range    INR 1.02 0.85 - 1.15   Comprehensive metabolic panel   Result Value Ref Range    Sodium 139 135 - 145 mmol/L    Potassium 4.1 3.4 - 5.3 mmol/L    Carbon Dioxide (CO2) 24 22 - 29 mmol/L    Anion Gap " 10 7 - 15 mmol/L    Urea Nitrogen 10.1 6.0 - 20.0 mg/dL    Creatinine 0.77 0.51 - 0.95 mg/dL    GFR Estimate >90 >60 mL/min/1.73m2    Calcium 8.4 (L) 8.6 - 10.0 mg/dL    Chloride 105 98 - 107 mmol/L    Glucose 107 (H) 70 - 99 mg/dL    Alkaline Phosphatase 72 40 - 150 U/L    AST 40 0 - 45 U/L    ALT 27 0 - 50 U/L    Protein Total 7.1 6.4 - 8.3 g/dL    Albumin 4.1 3.5 - 5.2 g/dL    Bilirubin Total 1.0 <=1.2 mg/dL   Result Value Ref Range    Lipase 17 13 - 60 U/L   Result Value Ref Range    Troponin T, High Sensitivity <6 <=14 ng/L   CBC with platelets and differential   Result Value Ref Range    WBC Count 6.5 4.0 - 11.0 10e3/uL    RBC Count 4.71 3.80 - 5.20 10e6/uL    Hemoglobin 12.2 11.7 - 15.7 g/dL    Hematocrit 35.6 35.0 - 47.0 %    MCV 76 (L) 78 - 100 fL    MCH 25.9 (L) 26.5 - 33.0 pg    MCHC 34.3 31.5 - 36.5 g/dL    RDW 13.7 10.0 - 15.0 %    Platelet Count 238 150 - 450 10e3/uL    % Neutrophils 82 %    % Lymphocytes 10 %    % Monocytes 8 %    % Eosinophils 0 %    % Basophils 0 %    % Immature Granulocytes 0 %    NRBCs per 100 WBC 0 <1 /100    Absolute Neutrophils 5.4 1.6 - 8.3 10e3/uL    Absolute Lymphocytes 0.6 (L) 0.8 - 5.3 10e3/uL    Absolute Monocytes 0.5 0.0 - 1.3 10e3/uL    Absolute Eosinophils 0.0 0.0 - 0.7 10e3/uL    Absolute Basophils 0.0 0.0 - 0.2 10e3/uL    Absolute Immature Granulocytes 0.0 <=0.4 10e3/uL    Absolute NRBCs 0.0 10e3/uL       RADIOLOGY:  I have independently reviewed and interpreted the above imaging, pending the final radiology read.  CT Abdomen Pelvis w Contrast   Final Result   IMPRESSION:    1.  4.1 x 4.0 x 4.6 cm right adnexal/right ovarian cyst. Ultrasound could be performed to further characterize this lesion.   2.  Appendix within normal limits.   3.  Mild splenomegaly.         CT Chest Pulmonary Embolism w Contrast   Final Result   IMPRESSION:   1.  Nonoptimal opacification of the mid and distal pulmonary arteries. No central or central subsegmental pulmonary embolism.   2.  No  acute airspace opacity.   3.  Borderline splenomegaly.             EK-rate is 107, sinus, there is no ST segment elevation or depression appreciated  I have independently reviewed and interpreted this EKG          I, Wm Rashede, am serving as a scribe to document services personally performed by Dr. Solorio based on my observation and the provider's statements to me. I, Alisha Solorio MD attest that Wm Rasheed is acting in a scribe capacity, has observed my performance of the services and has documented them in accordance with my direction.    Alisha Solorio MD  Emergency Medicine  [facility] EMERGENCY ROOM  [address] [address] [phone]  Dept: [phone]     Alisha Solorio MD  24 [phone]

## 2024-06-11 NOTE — DISCHARGE INSTRUCTIONS
Clear liquid diet if having nausea and vomiting  Dramamine as needed for nausea or vomiting  Imodium as needed for diarrhea  Tylenol 650 mg every 4 hours as needed for pain  Return to the emergency department for worsening problems or concerns

## 2024-07-20 ENCOUNTER — HEALTH MAINTENANCE LETTER (OUTPATIENT)
Age: 29
End: 2024-07-20

## 2024-11-22 ENCOUNTER — HOSPITAL ENCOUNTER (OUTPATIENT)
Dept: ULTRASOUND IMAGING | Facility: CLINIC | Age: 29
Discharge: HOME | End: 2024-11-22
Attending: INTERNAL MEDICINE | Admitting: INTERNAL MEDICINE
Payer: COMMERCIAL

## 2024-11-22 DIAGNOSIS — I82.412 ACUTE DEEP VEIN THROMBOSIS (DVT) OF FEMORAL VEIN OF LEFT LOWER EXTREMITY (H): ICD-10-CM

## 2024-11-22 PROCEDURE — 93971 EXTREMITY STUDY: CPT | Mod: LT

## 2025-04-11 ENCOUNTER — HOSPITAL ENCOUNTER (EMERGENCY)
Facility: CLINIC | Age: 30
Discharge: HOME | End: 2025-04-12
Attending: EMERGENCY MEDICINE | Admitting: EMERGENCY MEDICINE
Payer: COMMERCIAL

## 2025-04-11 DIAGNOSIS — E05.90 HYPERTHYROIDISM: ICD-10-CM

## 2025-04-11 DIAGNOSIS — N76.0 BACTERIAL VAGINOSIS: ICD-10-CM

## 2025-04-11 DIAGNOSIS — R10.30 LOWER ABDOMINAL PAIN: ICD-10-CM

## 2025-04-11 DIAGNOSIS — B96.89 BACTERIAL VAGINOSIS: ICD-10-CM

## 2025-04-11 PROCEDURE — 99285 EMERGENCY DEPT VISIT HI MDM: CPT | Mod: 25

## 2025-04-11 PROCEDURE — 87637 SARSCOV2&INF A&B&RSV AMP PRB: CPT | Performed by: EMERGENCY MEDICINE

## 2025-04-11 RX ORDER — ONDANSETRON 4 MG/1
4 TABLET, ORALLY DISINTEGRATING ORAL ONCE
Status: COMPLETED | OUTPATIENT
Start: 2025-04-11 | End: 2025-04-11

## 2025-04-11 RX ORDER — ACETAMINOPHEN 500 MG
1000 TABLET ORAL ONCE
Status: COMPLETED | OUTPATIENT
Start: 2025-04-11 | End: 2025-04-12

## 2025-04-11 ASSESSMENT — COLUMBIA-SUICIDE SEVERITY RATING SCALE - C-SSRS
1. IN THE PAST MONTH, HAVE YOU WISHED YOU WERE DEAD OR WISHED YOU COULD GO TO SLEEP AND NOT WAKE UP?: NO
2. HAVE YOU ACTUALLY HAD ANY THOUGHTS OF KILLING YOURSELF IN THE PAST MONTH?: NO
6. HAVE YOU EVER DONE ANYTHING, STARTED TO DO ANYTHING, OR PREPARED TO DO ANYTHING TO END YOUR LIFE?: NO

## 2025-04-12 ENCOUNTER — APPOINTMENT (OUTPATIENT)
Dept: CT IMAGING | Facility: CLINIC | Age: 30
End: 2025-04-12
Attending: EMERGENCY MEDICINE
Payer: COMMERCIAL

## 2025-04-12 VITALS
WEIGHT: 260.8 LBS | OXYGEN SATURATION: 98 % | DIASTOLIC BLOOD PRESSURE: 71 MMHG | RESPIRATION RATE: 24 BRPM | SYSTOLIC BLOOD PRESSURE: 141 MMHG | TEMPERATURE: 98.3 F | BODY MASS INDEX: 40.93 KG/M2 | HEIGHT: 67 IN | HEART RATE: 85 BPM

## 2025-04-12 LAB
ALBUMIN SERPL BCG-MCNC: 4 G/DL (ref 3.5–5.2)
ALBUMIN UR-MCNC: NEGATIVE MG/DL
ALP SERPL-CCNC: 66 U/L (ref 40–150)
ALT SERPL W P-5'-P-CCNC: 36 U/L (ref 0–50)
ANION GAP SERPL CALCULATED.3IONS-SCNC: 14 MMOL/L (ref 7–15)
APPEARANCE UR: CLEAR
AST SERPL W P-5'-P-CCNC: 65 U/L (ref 0–45)
BASOPHILS # BLD AUTO: 0 10E3/UL (ref 0–0.2)
BASOPHILS NFR BLD AUTO: 0 %
BILIRUB SERPL-MCNC: 1.2 MG/DL
BILIRUB UR QL STRIP: NEGATIVE
BUN SERPL-MCNC: 11.5 MG/DL (ref 6–20)
C TRACH DNA SPEC QL PROBE+SIG AMP: NEGATIVE
CALCIUM SERPL-MCNC: 8.9 MG/DL (ref 8.8–10.4)
CHLORIDE SERPL-SCNC: 102 MMOL/L (ref 98–107)
CLUE CELLS: PRESENT
COLOR UR AUTO: ABNORMAL
CREAT SERPL-MCNC: 0.6 MG/DL (ref 0.51–0.95)
EGFRCR SERPLBLD CKD-EPI 2021: >90 ML/MIN/1.73M2
EOSINOPHIL # BLD AUTO: 0 10E3/UL (ref 0–0.7)
EOSINOPHIL NFR BLD AUTO: 0 %
ERYTHROCYTE [DISTWIDTH] IN BLOOD BY AUTOMATED COUNT: 13 % (ref 10–15)
FLUAV RNA SPEC QL NAA+PROBE: NEGATIVE
FLUBV RNA RESP QL NAA+PROBE: NEGATIVE
GLUCOSE SERPL-MCNC: 103 MG/DL (ref 70–99)
GLUCOSE UR STRIP-MCNC: NEGATIVE MG/DL
HCG SERPL QL: NEGATIVE
HCO3 SERPL-SCNC: 19 MMOL/L (ref 22–29)
HCT VFR BLD AUTO: 35 % (ref 35–47)
HGB BLD-MCNC: 12.3 G/DL (ref 11.7–15.7)
HGB UR QL STRIP: ABNORMAL
IMM GRANULOCYTES # BLD: 0 10E3/UL
IMM GRANULOCYTES NFR BLD: 0 %
KETONES UR STRIP-MCNC: NEGATIVE MG/DL
LACTATE SERPL-SCNC: 1.4 MMOL/L (ref 0.7–2)
LEUKOCYTE ESTERASE UR QL STRIP: ABNORMAL
LYMPHOCYTES # BLD AUTO: 0.8 10E3/UL (ref 0.8–5.3)
LYMPHOCYTES NFR BLD AUTO: 11 %
MCH RBC QN AUTO: 26.2 PG (ref 26.5–33)
MCHC RBC AUTO-ENTMCNC: 35.1 G/DL (ref 31.5–36.5)
MCV RBC AUTO: 75 FL (ref 78–100)
MONOCYTES # BLD AUTO: 0.6 10E3/UL (ref 0–1.3)
MONOCYTES NFR BLD AUTO: 9 %
N GONORRHOEA DNA SPEC QL NAA+PROBE: NEGATIVE
NEUTROPHILS # BLD AUTO: 5.4 10E3/UL (ref 1.6–8.3)
NEUTROPHILS NFR BLD AUTO: 80 %
NITRATE UR QL: NEGATIVE
NRBC # BLD AUTO: 0 10E3/UL
NRBC BLD AUTO-RTO: 0 /100
PH UR STRIP: 5.5 [PH] (ref 5–7)
PLATELET # BLD AUTO: 191 10E3/UL (ref 150–450)
POTASSIUM SERPL-SCNC: 4 MMOL/L (ref 3.4–5.3)
PROCALCITONIN SERPL IA-MCNC: 0.12 NG/ML
PROT SERPL-MCNC: 6.6 G/DL (ref 6.4–8.3)
RBC # BLD AUTO: 4.69 10E6/UL (ref 3.8–5.2)
RBC URINE: 1 /HPF
RSV RNA SPEC NAA+PROBE: NEGATIVE
SARS-COV-2 RNA RESP QL NAA+PROBE: NEGATIVE
SODIUM SERPL-SCNC: 135 MMOL/L (ref 135–145)
SP GR UR STRIP: 1.01 (ref 1–1.03)
SPECIMEN TYPE: NORMAL
SQUAMOUS EPITHELIAL: 4 /HPF
T4 FREE SERPL-MCNC: 2.63 NG/DL (ref 0.9–1.7)
TRICHOMONAS, WET PREP: ABNORMAL
TSH SERPL DL<=0.005 MIU/L-ACNC: <0.01 UIU/ML (ref 0.3–4.2)
UROBILINOGEN UR STRIP-MCNC: NORMAL MG/DL
WBC # BLD AUTO: 6.7 10E3/UL (ref 4–11)
WBC URINE: 7 /HPF
WBC'S/HIGH POWER FIELD, WET PREP: ABNORMAL
YEAST, WET PREP: ABNORMAL

## 2025-04-12 PROCEDURE — 87086 URINE CULTURE/COLONY COUNT: CPT | Performed by: EMERGENCY MEDICINE

## 2025-04-12 PROCEDURE — 250N000009 HC RX 250: Performed by: EMERGENCY MEDICINE

## 2025-04-12 PROCEDURE — 74177 CT ABD & PELVIS W/CONTRAST: CPT

## 2025-04-12 PROCEDURE — 84439 ASSAY OF FREE THYROXINE: CPT | Performed by: EMERGENCY MEDICINE

## 2025-04-12 PROCEDURE — 87210 SMEAR WET MOUNT SALINE/INK: CPT | Performed by: EMERGENCY MEDICINE

## 2025-04-12 PROCEDURE — 84703 CHORIONIC GONADOTROPIN ASSAY: CPT | Performed by: EMERGENCY MEDICINE

## 2025-04-12 PROCEDURE — 250N000011 HC RX IP 250 OP 636: Performed by: EMERGENCY MEDICINE

## 2025-04-12 PROCEDURE — 80053 COMPREHEN METABOLIC PANEL: CPT | Performed by: EMERGENCY MEDICINE

## 2025-04-12 PROCEDURE — 258N000003 HC RX IP 258 OP 636: Performed by: EMERGENCY MEDICINE

## 2025-04-12 PROCEDURE — 96361 HYDRATE IV INFUSION ADD-ON: CPT

## 2025-04-12 PROCEDURE — 85025 COMPLETE CBC W/AUTO DIFF WBC: CPT | Performed by: EMERGENCY MEDICINE

## 2025-04-12 PROCEDURE — 36415 COLL VENOUS BLD VENIPUNCTURE: CPT | Performed by: EMERGENCY MEDICINE

## 2025-04-12 PROCEDURE — 87040 BLOOD CULTURE FOR BACTERIA: CPT | Performed by: EMERGENCY MEDICINE

## 2025-04-12 PROCEDURE — 84443 ASSAY THYROID STIM HORMONE: CPT | Performed by: EMERGENCY MEDICINE

## 2025-04-12 PROCEDURE — 81001 URINALYSIS AUTO W/SCOPE: CPT | Performed by: EMERGENCY MEDICINE

## 2025-04-12 PROCEDURE — 87491 CHLMYD TRACH DNA AMP PROBE: CPT | Performed by: EMERGENCY MEDICINE

## 2025-04-12 PROCEDURE — 84145 PROCALCITONIN (PCT): CPT | Performed by: EMERGENCY MEDICINE

## 2025-04-12 PROCEDURE — 96360 HYDRATION IV INFUSION INIT: CPT | Mod: 59

## 2025-04-12 PROCEDURE — 83605 ASSAY OF LACTIC ACID: CPT | Performed by: EMERGENCY MEDICINE

## 2025-04-12 PROCEDURE — 250N000013 HC RX MED GY IP 250 OP 250 PS 637: Performed by: EMERGENCY MEDICINE

## 2025-04-12 RX ORDER — IOPAMIDOL 755 MG/ML
500 INJECTION, SOLUTION INTRAVASCULAR ONCE
Status: COMPLETED | OUTPATIENT
Start: 2025-04-12 | End: 2025-04-12

## 2025-04-12 RX ORDER — KETOROLAC TROMETHAMINE 15 MG/ML
15 INJECTION, SOLUTION INTRAMUSCULAR; INTRAVENOUS ONCE
Status: DISCONTINUED | OUTPATIENT
Start: 2025-04-12 | End: 2025-04-12

## 2025-04-12 RX ORDER — METRONIDAZOLE 500 MG/1
500 TABLET ORAL 2 TIMES DAILY
Qty: 14 TABLET | Refills: 0 | Status: SHIPPED | OUTPATIENT
Start: 2025-04-12 | End: 2025-04-19

## 2025-04-12 RX ORDER — ONDANSETRON 4 MG/1
4 TABLET, ORALLY DISINTEGRATING ORAL EVERY 8 HOURS PRN
Qty: 10 TABLET | Refills: 0 | Status: SHIPPED | OUTPATIENT
Start: 2025-04-12 | End: 2025-04-15

## 2025-04-12 RX ADMIN — IOPAMIDOL 100 ML: 755 INJECTION, SOLUTION INTRAVENOUS at 01:29

## 2025-04-12 RX ADMIN — SODIUM CHLORIDE 1000 ML: 9 INJECTION, SOLUTION INTRAVENOUS at 00:42

## 2025-04-12 RX ADMIN — SODIUM CHLORIDE 65 ML: 9 INJECTION, SOLUTION INTRAVENOUS at 01:29

## 2025-04-12 RX ADMIN — SODIUM CHLORIDE 1000 ML: 9 INJECTION, SOLUTION INTRAVENOUS at 00:28

## 2025-04-12 RX ADMIN — ACETAMINOPHEN 1000 MG: 500 TABLET ORAL at 00:28

## 2025-04-12 ASSESSMENT — ACTIVITIES OF DAILY LIVING (ADL)
ADLS_ACUITY_SCORE: 41

## 2025-04-12 NOTE — ED PROVIDER NOTES
"  Emergency Department Note      History of Present Illness     Chief Complaint  Chills    HPI  Elizabeth Pope is a 30 year old female with history of Celiac disease, DVT, and Graves' disease who presents to the ED with her friend for evaluation of chills amongst other symptoms. She reports getting an IUD 3 days ago and was feeling fine with vaginal spotting but then began to have lower abdominal cramps and nonbloody diarrhea today. Since 2000 tonight, she began to have lightheadedness, dizziness, and chills. She also endorses slight vaginal discharge. Patient is sexually active but denies concerns for STIs. Denies fever, cough, sore throat, vomiting, dysuria, or hematuria. Patient occassionally takes THC gummies. No sick contacts.     Independent Historian  None    Review of External Notes  Endocrinology note 4/9 for hyperthyroidism; free T4 and TSH labs reviewed     Medical History and Problem List   Celiac disease  DVT  Graves' disease  Iron deficiency anemia   Vitamin D deficiency  Hypothyroidism   GERD    Medications   Eliquis   Pepcid  Tapazole  IUD Mirena    Surgical History   Cholecystectomy  Tonsillectomy  Adenoidectomy  Breast reduction   Physical Exam   Patient Vitals for the past 24 hrs:   BP Temp Temp src Pulse Resp SpO2 Height Weight   04/12/25 0103 -- -- -- (!) 121 -- 99 % -- --   04/12/25 0054 -- -- -- (!) 124 -- 100 % -- --   04/12/25 0001 (!) 141/71 -- -- (!) 128 -- 99 % -- --   04/11/25 2351 (!) 147/75 -- -- (!) 126 -- 100 % -- --   04/11/25 2318 (!) 139/92 99.7  F (37.6  C) Oral (!) 150 24 100 % 1.702 m (5' 7\") 118.3 kg (260 lb 12.9 oz)     Physical Exam  Nursing note and vitals reviewed.  Constitutional: Well nourished.  Eyes: Conjunctiva normal.  Pupils are equal, round, and reactive to light.   ENT: Nose normal. Mucous membranes pink and moist. TM normal. No posterior oropharyngeal erythema, no exudate. Uvula midline.    Neck: Normal range of motion.  CVS: Sinus tachycardia.  Normal heart " sounds.    Pulmonary: Lungs clear to auscultation bilaterally. No wheezes/rales/rhonchi.  GI: Abdomen soft. Mild lower abdominal tenderness. No rigidity or guarding.  No CVA tenderness  Pelvic: Normal external genitalia.  No vaginal bleeding.  Minimal white cervical discharge.  No CMT or adnexal tenderness noted.  Cervical os closed.  Chaperone RN Ruby CAIN: No calf tenderness or swelling.  Neuro: Alert. Follows simple commands.  Skin: Skin is warm and dry. No rash noted.   Psychiatric: Mildly anxious appearing      Diagnostics   Lab Results   Labs Ordered and Resulted from Time of ED Arrival to Time of ED Departure   COMPREHENSIVE METABOLIC PANEL - Abnormal       Result Value    Sodium 135      Potassium 4.0      Carbon Dioxide (CO2) 19 (*)     Anion Gap 14      Urea Nitrogen 11.5      Creatinine 0.60      GFR Estimate >90      Calcium 8.9      Chloride 102      Glucose 103 (*)     Alkaline Phosphatase 66      AST 65 (*)     ALT 36      Protein Total 6.6      Albumin 4.0      Bilirubin Total 1.2     ROUTINE UA WITH MICROSCOPIC - Abnormal    Color Urine Light Yellow      Appearance Urine Clear      Glucose Urine Negative      Bilirubin Urine Negative      Ketones Urine Negative      Specific Gravity Urine 1.013      Blood Urine Trace (*)     pH Urine 5.5      Protein Albumin Urine Negative      Urobilinogen Urine Normal      Nitrite Urine Negative      Leukocyte Esterase Urine Small (*)     RBC Urine 1      WBC Urine 7 (*)     Squamous Epithelials Urine 4 (*)    CBC WITH PLATELETS AND DIFFERENTIAL - Abnormal    WBC Count 6.7      RBC Count 4.69      Hemoglobin 12.3      Hematocrit 35.0      MCV 75 (*)     MCH 26.2 (*)     MCHC 35.1      RDW 13.0      Platelet Count 191      % Neutrophils 80      % Lymphocytes 11      % Monocytes 9      % Eosinophils 0      % Basophils 0      % Immature Granulocytes 0      NRBCs per 100 WBC 0      Absolute Neutrophils 5.4      Absolute Lymphocytes 0.8      Absolute Monocytes  0.6      Absolute Eosinophils 0.0      Absolute Basophils 0.0      Absolute Immature Granulocytes 0.0      Absolute NRBCs 0.0     TSH WITH FREE T4 REFLEX - Abnormal    TSH <0.01 (*)    WET PREPARATION - Abnormal    Trichomonas Absent      Yeast Absent      Clue Cells Present (*)     WBCs/high power field 1+ (*)    INFLUENZA A/B, RSV AND SARS-COV2 PCR - Normal    Influenza A PCR Negative      Influenza B PCR Negative      RSV PCR Negative      SARS CoV2 PCR Negative     HCG QUALITATIVE PREGNANCY - Normal    hCG Serum Qualitative Negative     LACTIC ACID WHOLE BLOOD WITH 1X REPEAT IN 2 HR WHEN >2 - Normal    Lactic Acid, Initial 1.4     PROCALCITONIN - Normal    Procalcitonin 0.12     CHLAMYDIA TRACHOMATIS/NEISSERIA GONORRHOEAE BY PCR   BLOOD CULTURE   URINE CULTURE     Imaging  CT Abdomen Pelvis w Contrast   Final Result   IMPRESSION:    1.  No acute abnormality. No bowel obstruction or inflammation.   2.  IUD in normal position.   3.  Mild splenomegaly, decreased.        Report per radiology    Independent Interpretation  None  ED Course    Medications Administered  Medications   ondansetron (ZOFRAN ODT) ODT tab 4 mg (4 mg Oral Not Given 4/11/25 2336)   sodium chloride 0.9% BOLUS 1,000 mL (0 mLs Intravenous Stopped 4/12/25 0229)   acetaminophen (TYLENOL) tablet 1,000 mg (1,000 mg Oral $Given 4/12/25 0028)   sodium chloride 0.9% BOLUS 1,000 mL (0 mLs Intravenous Stopped 4/12/25 0232)   sodium chloride 0.9 % bag for CT scan flush (65 mLs Intravenous $Given 4/12/25 0129)   iopamidol (ISOVUE-370) solution 500 mL (100 mLs Intravenous $Given 4/12/25 0129)       Procedures  Procedures     Discussion of Management  None    Additional Documentation  None    ED Course  ED Course as of 04/12/25 0423   Sat Apr 12, 2025   0023 I obtained history and examined the patient as noted above.    0120 I performed a pelvic exam.   0154 I rechecked the patient and explained findings.    0232 I rechecked the patient and explained findings.     1039 I rechecked the patient and explained findings. I prepared the patient to be discharged home.      Medical Decision Making / Diagnosis   CMS Diagnoses: None    MIPS     None    MDM  Elizabeth Pope is a 30 year old female presenting for evaluation of chills, lower abdominal cramping and nonbloody diarrhea.  She recently had an IUD placed.  She underwent extensive workup during her time in the ED.  Patient with low-grade fever on arrival, notably tachycardic and also mildly anxious appearing.  No significant abdominal tenderness on exam though decision was made to pursue formal CT imaging given concern for possible underlying sepsis source. She was given IV fluids as well as Tylenol with significant improvements throughout her time in the ED. Repeat VS improved as well. Patient underwent formal CT abdomen which is fortunately without evidence of intra-abdominal catastrophe.  IUD appears to be in normal position.  Wet prep suggestive of bacterial vaginosis, will initiate Flagyl.  She also was tested for gonorrhea/chlamydia in light of her symptoms though lower clinical suspicion for PID or endometritis based on exam.  I do not feel further dedicated pelvic imaging is needed at this point in time.  Remainder of labs reviewed.  She is not pregnant.  Mild transaminitis though this appears more chronic.  UA without evidence of obvious infection.  Patient was informed that blood cultures were drawn today and urine culture sent. She is aware that should these result positive she will receive a phone call. She is overall not septic appearing today. COVID-19/influenza/RSV negative.  She denies any URI symptoms and has clear lungs on exam, I doubt underlying pneumonia.  No clinical evidence to suggest pharyngitis or other upon reassessment, she reported significant symptom improvement and feels comfortable with discharge home at this point in time.  Patient also had thyroid studies sent today; discussed importance of  close endocrinology appointment given undetectable TSH though she is aware of need for followup. Doubt thyroid storm at this time.  Certainly could be underlying viral prodrome though I recommended trending fever curve and monitoring for worsening abdominal pain, protracted vomiting or should symptoms worsen or change to promptly represent. Antiemetics provided on dispo as well; PCP followup encouraged.    Disposition  The patient was discharged.     ICD-10 Codes:    ICD-10-CM    1. Bacterial vaginosis  N76.0     B96.89       2. Lower abdominal pain  R10.30          Discharge Medications  New Prescriptions    METRONIDAZOLE (FLAGYL) 500 MG TABLET    Take 1 tablet (500 mg) by mouth 2 times daily for 7 days.    ONDANSETRON (ZOFRAN ODT) 4 MG ODT TAB    Take 1 tablet (4 mg) by mouth every 8 hours as needed for nausea.     Scribe Disclosure:  I, Katy Campoverde, am serving as a scribe at 12:27 AM on 4/12/2025 to document services personally performed by Karli Escamilla DO based on my observations and the provider's statements to me.      Karli Escamilla DO  04/12/25 0684

## 2025-04-12 NOTE — ED TRIAGE NOTES
IUD placed on Tuesday. Since this morning, pt has had abd cramps and diarrhea. For last 3 hours, pt has had fever symptoms but had no thermometer.

## 2025-04-13 LAB — BACTERIA UR CULT: NORMAL

## 2025-04-17 LAB — BACTERIA BLD CULT: NO GROWTH
